# Patient Record
Sex: MALE | Race: WHITE | NOT HISPANIC OR LATINO | Employment: UNEMPLOYED | URBAN - NONMETROPOLITAN AREA
[De-identification: names, ages, dates, MRNs, and addresses within clinical notes are randomized per-mention and may not be internally consistent; named-entity substitution may affect disease eponyms.]

---

## 2023-01-01 ENCOUNTER — APPOINTMENT (OUTPATIENT)
Dept: GENERAL RADIOLOGY | Facility: HOSPITAL | Age: 68
End: 2023-01-01
Payer: COMMERCIAL

## 2023-01-01 ENCOUNTER — OUTSIDE FACILITY SERVICE (OUTPATIENT)
Dept: PULMONOLOGY | Facility: CLINIC | Age: 68
End: 2023-01-01
Payer: MEDICARE

## 2023-01-01 ENCOUNTER — HOSPITAL ENCOUNTER (OUTPATIENT)
Facility: HOSPITAL | Age: 68
End: 2023-01-16
Attending: INTERNAL MEDICINE | Admitting: INTERNAL MEDICINE
Payer: COMMERCIAL

## 2023-01-01 VITALS — WEIGHT: 100.31 LBS | BODY MASS INDEX: 16.71 KG/M2 | HEIGHT: 65 IN

## 2023-01-01 DIAGNOSIS — Z78.9 IMPAIRED MOBILITY AND ADLS: ICD-10-CM

## 2023-01-01 DIAGNOSIS — Z74.09 IMPAIRED PHYSICAL MOBILITY: ICD-10-CM

## 2023-01-01 DIAGNOSIS — R13.12 OROPHARYNGEAL DYSPHAGIA: ICD-10-CM

## 2023-01-01 DIAGNOSIS — Z74.09 IMPAIRED MOBILITY AND ADLS: ICD-10-CM

## 2023-01-01 LAB
ALBUMIN SERPL-MCNC: 2.2 G/DL (ref 3.5–5.2)
ALBUMIN SERPL-MCNC: 3 G/DL (ref 3.5–5.2)
ALBUMIN SERPL-MCNC: 3.3 G/DL (ref 3.5–5.2)
ALBUMIN SERPL-MCNC: 3.5 G/DL (ref 3.5–5.2)
ALBUMIN/GLOB SERPL: 0.9 G/DL
ALBUMIN/GLOB SERPL: 1.2 G/DL
ALBUMIN/GLOB SERPL: 1.3 G/DL
ALBUMIN/GLOB SERPL: 1.3 G/DL
ALP SERPL-CCNC: 102 U/L (ref 39–117)
ALP SERPL-CCNC: 114 U/L (ref 39–117)
ALP SERPL-CCNC: 120 U/L (ref 39–117)
ALP SERPL-CCNC: 88 U/L (ref 39–117)
ALT SERPL W P-5'-P-CCNC: 132 U/L (ref 1–41)
ALT SERPL W P-5'-P-CCNC: 19 U/L (ref 1–41)
ALT SERPL W P-5'-P-CCNC: 37 U/L (ref 1–41)
ALT SERPL W P-5'-P-CCNC: 56 U/L (ref 1–41)
ANION GAP SERPL CALCULATED.3IONS-SCNC: 0 MMOL/L (ref 5–15)
ANION GAP SERPL CALCULATED.3IONS-SCNC: 2 MMOL/L (ref 5–15)
ANION GAP SERPL CALCULATED.3IONS-SCNC: 6 MMOL/L (ref 5–15)
ANION GAP SERPL CALCULATED.3IONS-SCNC: 8 MMOL/L (ref 5–15)
ANION GAP SERPL CALCULATED.3IONS-SCNC: ABNORMAL MMOL/L
ANION GAP SERPL CALCULATED.3IONS-SCNC: ABNORMAL MMOL/L
ANISOCYTOSIS BLD QL: ABNORMAL
ARTERIAL PATENCY WRIST A: ABNORMAL
ARTERIAL PATENCY WRIST A: POSITIVE
AST SERPL-CCNC: 20 U/L (ref 1–40)
AST SERPL-CCNC: 21 U/L (ref 1–40)
AST SERPL-CCNC: 24 U/L (ref 1–40)
AST SERPL-CCNC: 37 U/L (ref 1–40)
ATMOSPHERIC PRESS: 740 MMHG
ATMOSPHERIC PRESS: 744 MMHG
ATMOSPHERIC PRESS: 751 MMHG
ATMOSPHERIC PRESS: 752 MMHG
ATMOSPHERIC PRESS: 753 MMHG
ATMOSPHERIC PRESS: 753 MMHG
BACTERIA SPEC AEROBE CULT: NORMAL
BACTERIA SPEC AEROBE CULT: NORMAL
BASE EXCESS BLDA CALC-SCNC: 14.5 MMOL/L (ref 0–2)
BASE EXCESS BLDA CALC-SCNC: 15.7 MMOL/L (ref 0–2)
BASE EXCESS BLDA CALC-SCNC: 23.6 MMOL/L (ref 0–2)
BASE EXCESS BLDA CALC-SCNC: 24.6 MMOL/L (ref 0–2)
BASE EXCESS BLDA CALC-SCNC: 25.1 MMOL/L (ref 0–2)
BASE EXCESS BLDA CALC-SCNC: 25.9 MMOL/L (ref 0–2)
BASOPHILS # BLD AUTO: 0.01 10*3/MM3 (ref 0–0.2)
BASOPHILS # BLD AUTO: 0.03 10*3/MM3 (ref 0–0.2)
BASOPHILS # BLD AUTO: 0.03 10*3/MM3 (ref 0–0.2)
BASOPHILS NFR BLD AUTO: 0.1 % (ref 0–1.5)
BASOPHILS NFR BLD AUTO: 0.1 % (ref 0–1.5)
BASOPHILS NFR BLD AUTO: 0.2 % (ref 0–1.5)
BDY SITE: ABNORMAL
BILIRUB SERPL-MCNC: 0.2 MG/DL (ref 0–1.2)
BILIRUB SERPL-MCNC: 0.3 MG/DL (ref 0–1.2)
BILIRUB SERPL-MCNC: 0.5 MG/DL (ref 0–1.2)
BILIRUB SERPL-MCNC: 0.6 MG/DL (ref 0–1.2)
BUN SERPL-MCNC: 109 MG/DL (ref 8–23)
BUN SERPL-MCNC: 22 MG/DL (ref 8–23)
BUN SERPL-MCNC: 42 MG/DL (ref 8–23)
BUN SERPL-MCNC: 45 MG/DL (ref 8–23)
BUN SERPL-MCNC: 46 MG/DL (ref 8–23)
BUN SERPL-MCNC: 46 MG/DL (ref 8–23)
BUN SERPL-MCNC: 49 MG/DL (ref 8–23)
BUN SERPL-MCNC: 62 MG/DL (ref 8–23)
BUN/CREAT SERPL: 106.9 (ref 7–25)
BUN/CREAT SERPL: 46.8 (ref 7–25)
BUN/CREAT SERPL: 67.7 (ref 7–25)
BUN/CREAT SERPL: 88.5 (ref 7–25)
BUN/CREAT SERPL: 90 (ref 7–25)
BUN/CREAT SERPL: 94.2 (ref 7–25)
BUN/CREAT SERPL: 97.7 (ref 7–25)
BUN/CREAT SERPL: 97.9 (ref 7–25)
C3 SERPL-MCNC: 62 MG/DL (ref 82–167)
C4 SERPL-MCNC: 15 MG/DL (ref 14–44)
CALCIUM SPEC-SCNC: 8.2 MG/DL (ref 8.6–10.5)
CALCIUM SPEC-SCNC: 8.2 MG/DL (ref 8.6–10.5)
CALCIUM SPEC-SCNC: 8.3 MG/DL (ref 8.6–10.5)
CALCIUM SPEC-SCNC: 8.4 MG/DL (ref 8.6–10.5)
CALCIUM SPEC-SCNC: 8.6 MG/DL (ref 8.6–10.5)
CALCIUM SPEC-SCNC: 8.8 MG/DL (ref 8.6–10.5)
CHLORIDE SERPL-SCNC: 101 MMOL/L (ref 98–107)
CHLORIDE SERPL-SCNC: 102 MMOL/L (ref 98–107)
CHLORIDE SERPL-SCNC: 85 MMOL/L (ref 98–107)
CHLORIDE SERPL-SCNC: 91 MMOL/L (ref 98–107)
CHLORIDE SERPL-SCNC: 93 MMOL/L (ref 98–107)
CHLORIDE SERPL-SCNC: 94 MMOL/L (ref 98–107)
CHLORIDE SERPL-SCNC: 97 MMOL/L (ref 98–107)
CHLORIDE SERPL-SCNC: 99 MMOL/L (ref 98–107)
CO2 SERPL-SCNC: 34 MMOL/L (ref 22–29)
CO2 SERPL-SCNC: 45 MMOL/L (ref 22–29)
CO2 SERPL-SCNC: 47 MMOL/L (ref 22–29)
CO2 SERPL-SCNC: 49 MMOL/L (ref 22–29)
CO2 SERPL-SCNC: 50 MMOL/L (ref 22–29)
CO2 SERPL-SCNC: 50 MMOL/L (ref 22–29)
CO2 SERPL-SCNC: >50 MMOL/L (ref 22–29)
CO2 SERPL-SCNC: >50 MMOL/L (ref 22–29)
CREAT SERPL-MCNC: 0.43 MG/DL (ref 0.76–1.27)
CREAT SERPL-MCNC: 0.47 MG/DL (ref 0.76–1.27)
CREAT SERPL-MCNC: 0.47 MG/DL (ref 0.76–1.27)
CREAT SERPL-MCNC: 0.5 MG/DL (ref 0.76–1.27)
CREAT SERPL-MCNC: 0.52 MG/DL (ref 0.76–1.27)
CREAT SERPL-MCNC: 0.52 MG/DL (ref 0.76–1.27)
CREAT SERPL-MCNC: 0.58 MG/DL (ref 0.76–1.27)
CREAT SERPL-MCNC: 1.61 MG/DL (ref 0.76–1.27)
DEPRECATED RDW RBC AUTO: 45.1 FL (ref 37–54)
DEPRECATED RDW RBC AUTO: 49.1 FL (ref 37–54)
DEPRECATED RDW RBC AUTO: 50.1 FL (ref 37–54)
DEPRECATED RDW RBC AUTO: 55.7 FL (ref 37–54)
EGFRCR SERPLBLD CKD-EPI 2021: 107.6 ML/MIN/1.73
EGFRCR SERPLBLD CKD-EPI 2021: 111.2 ML/MIN/1.73
EGFRCR SERPLBLD CKD-EPI 2021: 111.2 ML/MIN/1.73
EGFRCR SERPLBLD CKD-EPI 2021: 112.5 ML/MIN/1.73
EGFRCR SERPLBLD CKD-EPI 2021: 114.6 ML/MIN/1.73
EGFRCR SERPLBLD CKD-EPI 2021: 114.6 ML/MIN/1.73
EGFRCR SERPLBLD CKD-EPI 2021: 117.7 ML/MIN/1.73
EGFRCR SERPLBLD CKD-EPI 2021: 46.9 ML/MIN/1.73
EOSINOPHIL # BLD AUTO: 0 10*3/MM3 (ref 0–0.4)
EOSINOPHIL NFR BLD AUTO: 0 % (ref 0.3–6.2)
EPAP: 4
EPAP: 5
ERYTHROCYTE [DISTWIDTH] IN BLOOD BY AUTOMATED COUNT: 14.3 % (ref 12.3–15.4)
ERYTHROCYTE [DISTWIDTH] IN BLOOD BY AUTOMATED COUNT: 14.5 % (ref 12.3–15.4)
ERYTHROCYTE [DISTWIDTH] IN BLOOD BY AUTOMATED COUNT: 15.5 % (ref 12.3–15.4)
ERYTHROCYTE [DISTWIDTH] IN BLOOD BY AUTOMATED COUNT: 16.3 % (ref 12.3–15.4)
GAS FLOW AIRWAY: 3 LPM
GAS FLOW AIRWAY: 3 LPM
GLOBULIN UR ELPH-MCNC: 2.4 GM/DL
GLOBULIN UR ELPH-MCNC: 2.5 GM/DL
GLOBULIN UR ELPH-MCNC: 2.5 GM/DL
GLOBULIN UR ELPH-MCNC: 2.7 GM/DL
GLUCOSE BLDC GLUCOMTR-MCNC: 102 MG/DL (ref 70–130)
GLUCOSE BLDC GLUCOMTR-MCNC: 102 MG/DL (ref 70–130)
GLUCOSE BLDC GLUCOMTR-MCNC: 122 MG/DL (ref 70–130)
GLUCOSE BLDC GLUCOMTR-MCNC: 126 MG/DL (ref 70–130)
GLUCOSE BLDC GLUCOMTR-MCNC: 129 MG/DL (ref 70–130)
GLUCOSE BLDC GLUCOMTR-MCNC: 131 MG/DL (ref 70–130)
GLUCOSE BLDC GLUCOMTR-MCNC: 133 MG/DL (ref 70–130)
GLUCOSE BLDC GLUCOMTR-MCNC: 137 MG/DL (ref 70–130)
GLUCOSE BLDC GLUCOMTR-MCNC: 138 MG/DL (ref 70–130)
GLUCOSE BLDC GLUCOMTR-MCNC: 139 MG/DL (ref 70–130)
GLUCOSE BLDC GLUCOMTR-MCNC: 153 MG/DL (ref 70–130)
GLUCOSE BLDC GLUCOMTR-MCNC: 163 MG/DL (ref 70–130)
GLUCOSE BLDC GLUCOMTR-MCNC: 168 MG/DL (ref 70–130)
GLUCOSE BLDC GLUCOMTR-MCNC: 169 MG/DL (ref 70–130)
GLUCOSE BLDC GLUCOMTR-MCNC: 172 MG/DL (ref 70–130)
GLUCOSE BLDC GLUCOMTR-MCNC: 175 MG/DL (ref 70–130)
GLUCOSE BLDC GLUCOMTR-MCNC: 178 MG/DL (ref 70–130)
GLUCOSE BLDC GLUCOMTR-MCNC: 179 MG/DL (ref 70–130)
GLUCOSE BLDC GLUCOMTR-MCNC: 179 MG/DL (ref 70–130)
GLUCOSE BLDC GLUCOMTR-MCNC: 182 MG/DL (ref 70–130)
GLUCOSE BLDC GLUCOMTR-MCNC: 183 MG/DL (ref 70–130)
GLUCOSE BLDC GLUCOMTR-MCNC: 185 MG/DL (ref 70–130)
GLUCOSE BLDC GLUCOMTR-MCNC: 190 MG/DL (ref 70–130)
GLUCOSE BLDC GLUCOMTR-MCNC: 192 MG/DL (ref 70–130)
GLUCOSE BLDC GLUCOMTR-MCNC: 203 MG/DL (ref 70–130)
GLUCOSE BLDC GLUCOMTR-MCNC: 206 MG/DL (ref 70–130)
GLUCOSE BLDC GLUCOMTR-MCNC: 207 MG/DL (ref 70–130)
GLUCOSE BLDC GLUCOMTR-MCNC: 211 MG/DL (ref 70–130)
GLUCOSE BLDC GLUCOMTR-MCNC: 214 MG/DL (ref 70–130)
GLUCOSE BLDC GLUCOMTR-MCNC: 217 MG/DL (ref 70–130)
GLUCOSE BLDC GLUCOMTR-MCNC: 225 MG/DL (ref 70–130)
GLUCOSE BLDC GLUCOMTR-MCNC: 225 MG/DL (ref 70–130)
GLUCOSE BLDC GLUCOMTR-MCNC: 227 MG/DL (ref 70–130)
GLUCOSE BLDC GLUCOMTR-MCNC: 231 MG/DL (ref 70–130)
GLUCOSE BLDC GLUCOMTR-MCNC: 234 MG/DL (ref 70–130)
GLUCOSE BLDC GLUCOMTR-MCNC: 237 MG/DL (ref 70–130)
GLUCOSE BLDC GLUCOMTR-MCNC: 239 MG/DL (ref 70–130)
GLUCOSE BLDC GLUCOMTR-MCNC: 241 MG/DL (ref 70–130)
GLUCOSE BLDC GLUCOMTR-MCNC: 247 MG/DL (ref 70–130)
GLUCOSE BLDC GLUCOMTR-MCNC: 251 MG/DL (ref 70–130)
GLUCOSE BLDC GLUCOMTR-MCNC: 259 MG/DL (ref 70–130)
GLUCOSE BLDC GLUCOMTR-MCNC: 271 MG/DL (ref 70–130)
GLUCOSE BLDC GLUCOMTR-MCNC: 341 MG/DL (ref 70–130)
GLUCOSE BLDC GLUCOMTR-MCNC: 344 MG/DL (ref 70–130)
GLUCOSE BLDC GLUCOMTR-MCNC: 78 MG/DL (ref 70–130)
GLUCOSE BLDC GLUCOMTR-MCNC: 79 MG/DL (ref 70–130)
GLUCOSE SERPL-MCNC: 154 MG/DL (ref 65–99)
GLUCOSE SERPL-MCNC: 169 MG/DL (ref 65–99)
GLUCOSE SERPL-MCNC: 226 MG/DL (ref 65–99)
GLUCOSE SERPL-MCNC: 240 MG/DL (ref 65–99)
GLUCOSE SERPL-MCNC: 248 MG/DL (ref 65–99)
GLUCOSE SERPL-MCNC: 274 MG/DL (ref 65–99)
GLUCOSE SERPL-MCNC: 277 MG/DL (ref 65–99)
GLUCOSE SERPL-MCNC: 85 MG/DL (ref 65–99)
HCO3 BLDA-SCNC: 44.1 MMOL/L (ref 20–26)
HCO3 BLDA-SCNC: 45.3 MMOL/L (ref 20–26)
HCO3 BLDA-SCNC: 52.2 MMOL/L (ref 20–26)
HCO3 BLDA-SCNC: 52.8 MMOL/L (ref 20–26)
HCO3 BLDA-SCNC: 54.9 MMOL/L (ref 20–26)
HCO3 BLDA-SCNC: 55.3 MMOL/L (ref 20–26)
HCT VFR BLD AUTO: 29.6 % (ref 37.5–51)
HCT VFR BLD AUTO: 35.2 % (ref 37.5–51)
HCT VFR BLD AUTO: 38.3 % (ref 37.5–51)
HCT VFR BLD AUTO: 38.6 % (ref 37.5–51)
HGB BLD-MCNC: 10.4 G/DL (ref 13–17.7)
HGB BLD-MCNC: 12.1 G/DL (ref 13–17.7)
HGB BLD-MCNC: 12.2 G/DL (ref 13–17.7)
HGB BLD-MCNC: 8.7 G/DL (ref 13–17.7)
HYPOCHROMIA BLD QL: ABNORMAL
IMM GRANULOCYTES # BLD AUTO: 0.06 10*3/MM3 (ref 0–0.05)
IMM GRANULOCYTES # BLD AUTO: 0.11 10*3/MM3 (ref 0–0.05)
IMM GRANULOCYTES # BLD AUTO: 0.21 10*3/MM3 (ref 0–0.05)
IMM GRANULOCYTES NFR BLD AUTO: 0.5 % (ref 0–0.5)
IMM GRANULOCYTES NFR BLD AUTO: 0.8 % (ref 0–0.5)
IMM GRANULOCYTES NFR BLD AUTO: 0.9 % (ref 0–0.5)
INHALED O2 CONCENTRATION: 30 %
INHALED O2 CONCENTRATION: 40 %
IPAP: 18
LYMPHOCYTES # BLD AUTO: 0.13 10*3/MM3 (ref 0.7–3.1)
LYMPHOCYTES # BLD AUTO: 0.28 10*3/MM3 (ref 0.7–3.1)
LYMPHOCYTES # BLD AUTO: 0.64 10*3/MM3 (ref 0.7–3.1)
LYMPHOCYTES # BLD MANUAL: 0.32 10*3/MM3 (ref 0.7–3.1)
LYMPHOCYTES NFR BLD AUTO: 1.1 % (ref 19.6–45.3)
LYMPHOCYTES NFR BLD AUTO: 2 % (ref 19.6–45.3)
LYMPHOCYTES NFR BLD AUTO: 2.6 % (ref 19.6–45.3)
LYMPHOCYTES NFR BLD MANUAL: 15 % (ref 5–12)
Lab: ABNORMAL
MAGNESIUM SERPL-MCNC: 2 MG/DL (ref 1.6–2.4)
MAGNESIUM SERPL-MCNC: 2.5 MG/DL (ref 1.6–2.4)
MAGNESIUM SERPL-MCNC: 2.5 MG/DL (ref 1.6–2.4)
MAGNESIUM SERPL-MCNC: 3.1 MG/DL (ref 1.6–2.4)
MCH RBC QN AUTO: 27.5 PG (ref 26.6–33)
MCH RBC QN AUTO: 28.4 PG (ref 26.6–33)
MCH RBC QN AUTO: 28.5 PG (ref 26.6–33)
MCH RBC QN AUTO: 28.7 PG (ref 26.6–33)
MCHC RBC AUTO-ENTMCNC: 29.4 G/DL (ref 31.5–35.7)
MCHC RBC AUTO-ENTMCNC: 29.5 G/DL (ref 31.5–35.7)
MCHC RBC AUTO-ENTMCNC: 31.6 G/DL (ref 31.5–35.7)
MCHC RBC AUTO-ENTMCNC: 31.6 G/DL (ref 31.5–35.7)
MCV RBC AUTO: 87.1 FL (ref 79–97)
MCV RBC AUTO: 90.8 FL (ref 79–97)
MCV RBC AUTO: 96.2 FL (ref 79–97)
MCV RBC AUTO: 97 FL (ref 79–97)
MODALITY: ABNORMAL
MONOCYTES # BLD AUTO: 0.49 10*3/MM3 (ref 0.1–0.9)
MONOCYTES # BLD AUTO: 1.06 10*3/MM3 (ref 0.1–0.9)
MONOCYTES # BLD AUTO: 2.23 10*3/MM3 (ref 0.1–0.9)
MONOCYTES # BLD: 1.18 10*3/MM3 (ref 0.1–0.9)
MONOCYTES NFR BLD AUTO: 4.1 % (ref 5–12)
MONOCYTES NFR BLD AUTO: 7.6 % (ref 5–12)
MONOCYTES NFR BLD AUTO: 9.2 % (ref 5–12)
NEUTROPHILS # BLD AUTO: 6.38 10*3/MM3 (ref 1.7–7)
NEUTROPHILS NFR BLD AUTO: 11.14 10*3/MM3 (ref 1.7–7)
NEUTROPHILS NFR BLD AUTO: 12.5 10*3/MM3 (ref 1.7–7)
NEUTROPHILS NFR BLD AUTO: 21.11 10*3/MM3 (ref 1.7–7)
NEUTROPHILS NFR BLD AUTO: 87.2 % (ref 42.7–76)
NEUTROPHILS NFR BLD AUTO: 89.4 % (ref 42.7–76)
NEUTROPHILS NFR BLD AUTO: 94.2 % (ref 42.7–76)
NEUTROPHILS NFR BLD MANUAL: 76 % (ref 42.7–76)
NEUTS BAND NFR BLD MANUAL: 5 % (ref 0–5)
NRBC BLD AUTO-RTO: 0 /100 WBC (ref 0–0.2)
OVALOCYTES BLD QL SMEAR: ABNORMAL
PAW @ PEAK INSP FLOW SETTING VENT: 18 CMH2O
PAW @ PEAK INSP FLOW SETTING VENT: 18 CMH2O
PCO2 BLDA: 70.4 MM HG (ref 35–45)
PCO2 BLDA: 85.3 MM HG (ref 35–45)
PCO2 BLDA: 86.3 MM HG (ref 35–45)
PCO2 BLDA: 90 MM HG (ref 35–45)
PCO2 BLDA: 93.7 MM HG (ref 35–45)
PCO2 BLDA: 98.2 MM HG (ref 35–45)
PH BLDA: 7.32 PH UNITS (ref 7.35–7.45)
PH BLDA: 7.33 PH UNITS (ref 7.35–7.45)
PH BLDA: 7.36 PH UNITS (ref 7.35–7.45)
PH BLDA: 7.38 PH UNITS (ref 7.35–7.45)
PH BLDA: 7.38 PH UNITS (ref 7.35–7.45)
PH BLDA: 7.48 PH UNITS (ref 7.35–7.45)
PLATELET # BLD AUTO: 124 10*3/MM3 (ref 140–450)
PLATELET # BLD AUTO: 302 10*3/MM3 (ref 140–450)
PLATELET # BLD AUTO: 385 10*3/MM3 (ref 140–450)
PLATELET # BLD AUTO: 443 10*3/MM3 (ref 140–450)
PMV BLD AUTO: 11.8 FL (ref 6–12)
PMV BLD AUTO: 12.6 FL (ref 6–12)
PMV BLD AUTO: 12.9 FL (ref 6–12)
PMV BLD AUTO: 14.8 FL (ref 6–12)
PO2 BLDA: 101 MM HG (ref 83–108)
PO2 BLDA: 49.7 MM HG (ref 83–108)
PO2 BLDA: 67.9 MM HG (ref 83–108)
PO2 BLDA: 71.9 MM HG (ref 83–108)
PO2 BLDA: 78.4 MM HG (ref 83–108)
PO2 BLDA: 81.7 MM HG (ref 83–108)
POTASSIUM SERPL-SCNC: 3.9 MMOL/L (ref 3.5–5.2)
POTASSIUM SERPL-SCNC: 4.1 MMOL/L (ref 3.5–5.2)
POTASSIUM SERPL-SCNC: 4.2 MMOL/L (ref 3.5–5.2)
POTASSIUM SERPL-SCNC: 4.4 MMOL/L (ref 3.5–5.2)
POTASSIUM SERPL-SCNC: 4.5 MMOL/L (ref 3.5–5.2)
POTASSIUM SERPL-SCNC: 5.3 MMOL/L (ref 3.5–5.2)
POTASSIUM SERPL-SCNC: 5.6 MMOL/L (ref 3.5–5.2)
PREALB SERPL-MCNC: 26.5 MG/DL (ref 20–40)
PROT SERPL-MCNC: 4.6 G/DL (ref 6–8.5)
PROT SERPL-MCNC: 5.5 G/DL (ref 6–8.5)
PROT SERPL-MCNC: 5.8 G/DL (ref 6–8.5)
PROT SERPL-MCNC: 6.2 G/DL (ref 6–8.5)
QT INTERVAL: 360 MS
QT INTERVAL: 392 MS
QTC INTERVAL: 437 MS
QTC INTERVAL: 462 MS
RBC # BLD AUTO: 3.05 10*6/MM3 (ref 4.14–5.8)
RBC # BLD AUTO: 3.66 10*6/MM3 (ref 4.14–5.8)
RBC # BLD AUTO: 4.22 10*6/MM3 (ref 4.14–5.8)
RBC # BLD AUTO: 4.43 10*6/MM3 (ref 4.14–5.8)
SAO2 % BLDCOA: 87.8 % (ref 94–99)
SAO2 % BLDCOA: 94.1 % (ref 94–99)
SAO2 % BLDCOA: 95.3 % (ref 94–99)
SAO2 % BLDCOA: 95.5 % (ref 94–99)
SAO2 % BLDCOA: 96.1 % (ref 94–99)
SAO2 % BLDCOA: 98.1 % (ref 94–99)
SET MECH RESP RATE: 16
SET MECH RESP RATE: 20
SMALL PLATELETS BLD QL SMEAR: ABNORMAL
SODIUM SERPL-SCNC: 133 MMOL/L (ref 136–145)
SODIUM SERPL-SCNC: 134 MMOL/L (ref 136–145)
SODIUM SERPL-SCNC: 144 MMOL/L (ref 136–145)
SODIUM SERPL-SCNC: 145 MMOL/L (ref 136–145)
SODIUM SERPL-SCNC: 147 MMOL/L (ref 136–145)
SODIUM SERPL-SCNC: 149 MMOL/L (ref 136–145)
SODIUM SERPL-SCNC: 151 MMOL/L (ref 136–145)
SODIUM SERPL-SCNC: 151 MMOL/L (ref 136–145)
VARIANT LYMPHS NFR BLD MANUAL: 4 % (ref 19.6–45.3)
VENTILATOR MODE: ABNORMAL
VT ON VENT VENT: 378 ML
VT ON VENT VENT: 450 ML
VT ON VENT VENT: 561 ML
WBC MORPH BLD: NORMAL
WBC NRBC COR # BLD: 11.83 10*3/MM3 (ref 3.4–10.8)
WBC NRBC COR # BLD: 13.98 10*3/MM3 (ref 3.4–10.8)
WBC NRBC COR # BLD: 24.22 10*3/MM3 (ref 3.4–10.8)
WBC NRBC COR # BLD: 7.88 10*3/MM3 (ref 3.4–10.8)
WHOLE BLOOD HOLD SPECIMEN: NORMAL

## 2023-01-01 PROCEDURE — 92610 EVALUATE SWALLOWING FUNCTION: CPT | Performed by: SPEECH-LANGUAGE PATHOLOGIST

## 2023-01-01 PROCEDURE — 99233 SBSQ HOSP IP/OBS HIGH 50: CPT | Performed by: INTERNAL MEDICINE

## 2023-01-01 PROCEDURE — 99291 CRITICAL CARE FIRST HOUR: CPT | Performed by: INTERNAL MEDICINE

## 2023-01-01 PROCEDURE — 25010000002 METHYLPREDNISOLONE PER 40 MG: Performed by: INTERNAL MEDICINE

## 2023-01-01 PROCEDURE — 85025 COMPLETE CBC W/AUTO DIFF WBC: CPT | Performed by: INTERNAL MEDICINE

## 2023-01-01 PROCEDURE — 25010000002 HEPARIN (PORCINE) PER 1000 UNITS: Performed by: INTERNAL MEDICINE

## 2023-01-01 PROCEDURE — 99222 1ST HOSP IP/OBS MODERATE 55: CPT | Performed by: INTERNAL MEDICINE

## 2023-01-01 PROCEDURE — 93010 ELECTROCARDIOGRAM REPORT: CPT | Performed by: INTERNAL MEDICINE

## 2023-01-01 PROCEDURE — 80048 BASIC METABOLIC PNL TOTAL CA: CPT | Performed by: INTERNAL MEDICINE

## 2023-01-01 PROCEDURE — 25010000002 CEFTRIAXONE PER 250 MG: Performed by: INTERNAL MEDICINE

## 2023-01-01 PROCEDURE — 82962 GLUCOSE BLOOD TEST: CPT

## 2023-01-01 PROCEDURE — 86160 COMPLEMENT ANTIGEN: CPT | Performed by: INTERNAL MEDICINE

## 2023-01-01 PROCEDURE — 82803 BLOOD GASES ANY COMBINATION: CPT

## 2023-01-01 PROCEDURE — 71045 X-RAY EXAM CHEST 1 VIEW: CPT

## 2023-01-01 PROCEDURE — 80053 COMPREHEN METABOLIC PANEL: CPT | Performed by: INTERNAL MEDICINE

## 2023-01-01 PROCEDURE — 97162 PT EVAL MOD COMPLEX 30 MIN: CPT

## 2023-01-01 PROCEDURE — 85007 BL SMEAR W/DIFF WBC COUNT: CPT | Performed by: INTERNAL MEDICINE

## 2023-01-01 PROCEDURE — 84132 ASSAY OF SERUM POTASSIUM: CPT | Performed by: INTERNAL MEDICINE

## 2023-01-01 PROCEDURE — 83735 ASSAY OF MAGNESIUM: CPT | Performed by: INTERNAL MEDICINE

## 2023-01-01 PROCEDURE — 25010000002 FUROSEMIDE PER 20 MG: Performed by: INTERNAL MEDICINE

## 2023-01-01 PROCEDURE — 25010000002 MORPHINE PER 10 MG

## 2023-01-01 PROCEDURE — 25010000002 LORAZEPAM PER 2 MG

## 2023-01-01 PROCEDURE — 93005 ELECTROCARDIOGRAM TRACING: CPT | Performed by: INTERNAL MEDICINE

## 2023-01-01 PROCEDURE — 97166 OT EVAL MOD COMPLEX 45 MIN: CPT

## 2023-01-01 PROCEDURE — 87040 BLOOD CULTURE FOR BACTERIA: CPT | Performed by: INTERNAL MEDICINE

## 2023-01-01 PROCEDURE — 74018 RADEX ABDOMEN 1 VIEW: CPT

## 2023-01-01 PROCEDURE — 84134 ASSAY OF PREALBUMIN: CPT | Performed by: INTERNAL MEDICINE

## 2023-01-01 RX ORDER — METHYLPREDNISOLONE SODIUM SUCCINATE 40 MG/ML
40 INJECTION, POWDER, LYOPHILIZED, FOR SOLUTION INTRAMUSCULAR; INTRAVENOUS EVERY 12 HOURS
Status: DISCONTINUED | OUTPATIENT
Start: 2023-01-01 | End: 2023-01-01

## 2023-01-01 RX ORDER — CARVEDILOL 6.25 MG/1
6.25 TABLET ORAL 2 TIMES DAILY WITH MEALS
Status: DISCONTINUED | OUTPATIENT
Start: 2023-01-01 | End: 2023-01-01

## 2023-01-01 RX ORDER — FUROSEMIDE 10 MG/ML
60 INJECTION INTRAMUSCULAR; INTRAVENOUS ONCE
Status: DISCONTINUED | OUTPATIENT
Start: 2023-01-01 | End: 2023-01-01

## 2023-01-01 RX ORDER — MORPHINE SULFATE 2 MG/ML
1 INJECTION, SOLUTION INTRAMUSCULAR; INTRAVENOUS
Status: DISCONTINUED | OUTPATIENT
Start: 2023-01-01 | End: 2023-01-01 | Stop reason: HOSPADM

## 2023-01-01 RX ORDER — TRAZODONE HYDROCHLORIDE 50 MG/1
25 TABLET ORAL NIGHTLY
Status: DISCONTINUED | OUTPATIENT
Start: 2023-01-01 | End: 2023-01-01

## 2023-01-01 RX ORDER — POTASSIUM CHLORIDE 1.5 G/1.77G
40 POWDER, FOR SOLUTION ORAL ONCE
Status: DISCONTINUED | OUTPATIENT
Start: 2023-01-01 | End: 2023-01-01

## 2023-01-01 RX ORDER — INSULIN ASPART 100 [IU]/ML
0-12 INJECTION, SOLUTION INTRAVENOUS; SUBCUTANEOUS
Status: DISCONTINUED | OUTPATIENT
Start: 2023-01-01 | End: 2023-01-01

## 2023-01-01 RX ORDER — ACETAMINOPHEN 650 MG/1
650 SUPPOSITORY RECTAL EVERY 4 HOURS PRN
Status: DISCONTINUED | OUTPATIENT
Start: 2023-01-01 | End: 2023-01-01

## 2023-01-01 RX ORDER — IPRATROPIUM BROMIDE AND ALBUTEROL SULFATE 2.5; .5 MG/3ML; MG/3ML
3 SOLUTION RESPIRATORY (INHALATION) EVERY 4 HOURS PRN
Status: DISCONTINUED | OUTPATIENT
Start: 2023-01-01 | End: 2023-01-01

## 2023-01-01 RX ORDER — CLOPIDOGREL BISULFATE 75 MG/1
75 TABLET ORAL DAILY
Status: DISCONTINUED | OUTPATIENT
Start: 2023-01-01 | End: 2023-01-01

## 2023-01-01 RX ORDER — ACETAMINOPHEN 160 MG/5ML
650 SOLUTION ORAL EVERY 4 HOURS PRN
Status: DISCONTINUED | OUTPATIENT
Start: 2023-01-01 | End: 2023-01-01

## 2023-01-01 RX ORDER — BUDESONIDE 0.5 MG/2ML
0.5 INHALANT ORAL
Status: DISCONTINUED | OUTPATIENT
Start: 2023-01-01 | End: 2023-01-01

## 2023-01-01 RX ORDER — DEXTROSE MONOHYDRATE 25 G/50ML
25 INJECTION, SOLUTION INTRAVENOUS
Status: DISCONTINUED | OUTPATIENT
Start: 2023-01-01 | End: 2023-01-01

## 2023-01-01 RX ORDER — ESCITALOPRAM OXALATE 10 MG/1
30 TABLET ORAL DAILY
Status: DISCONTINUED | OUTPATIENT
Start: 2023-01-01 | End: 2023-01-01

## 2023-01-01 RX ORDER — ONDANSETRON 2 MG/ML
4 INJECTION INTRAMUSCULAR; INTRAVENOUS EVERY 4 HOURS PRN
Status: DISCONTINUED | OUTPATIENT
Start: 2023-01-01 | End: 2023-01-01

## 2023-01-01 RX ORDER — ALPRAZOLAM 0.5 MG/1
0.5 TABLET ORAL 3 TIMES DAILY PRN
Status: DISCONTINUED | OUTPATIENT
Start: 2023-01-01 | End: 2023-01-01

## 2023-01-01 RX ORDER — SODIUM CHLORIDE 0.9 % (FLUSH) 0.9 %
3 SYRINGE (ML) INJECTION AS NEEDED
Status: DISCONTINUED | OUTPATIENT
Start: 2023-01-01 | End: 2023-01-01

## 2023-01-01 RX ORDER — ACETAMINOPHEN 325 MG/1
650 TABLET ORAL EVERY 6 HOURS PRN
Status: DISCONTINUED | OUTPATIENT
Start: 2023-01-01 | End: 2023-01-01

## 2023-01-01 RX ORDER — METRONIDAZOLE 500 MG/100ML
500 INJECTION, SOLUTION INTRAVENOUS EVERY 8 HOURS
Status: DISPENSED | OUTPATIENT
Start: 2023-01-01 | End: 2023-01-01

## 2023-01-01 RX ORDER — ISOSORBIDE DINITRATE 5 MG/1
15 TABLET ORAL
Status: DISCONTINUED | OUTPATIENT
Start: 2023-01-01 | End: 2023-01-01

## 2023-01-01 RX ORDER — ONDANSETRON 4 MG/1
4 TABLET, FILM COATED ORAL EVERY 6 HOURS PRN
Status: DISCONTINUED | OUTPATIENT
Start: 2023-01-01 | End: 2023-01-01

## 2023-01-01 RX ORDER — SODIUM CHLORIDE 0.9 % (FLUSH) 0.9 %
3 SYRINGE (ML) INJECTION EVERY 12 HOURS SCHEDULED
Status: DISCONTINUED | OUTPATIENT
Start: 2023-01-01 | End: 2023-01-01

## 2023-01-01 RX ORDER — HEPARIN SODIUM 5000 [USP'U]/ML
5000 INJECTION, SOLUTION INTRAVENOUS; SUBCUTANEOUS EVERY 8 HOURS SCHEDULED
Status: DISCONTINUED | OUTPATIENT
Start: 2023-01-01 | End: 2023-01-01

## 2023-01-01 RX ORDER — GUAIFENESIN 200 MG/10ML
200 LIQUID ORAL EVERY 4 HOURS PRN
Status: DISCONTINUED | OUTPATIENT
Start: 2023-01-01 | End: 2023-01-01

## 2023-01-01 RX ORDER — FUROSEMIDE 10 MG/ML
40 INJECTION INTRAMUSCULAR; INTRAVENOUS ONCE
Status: DISCONTINUED | OUTPATIENT
Start: 2023-01-01 | End: 2023-01-01

## 2023-01-01 RX ORDER — LORAZEPAM 2 MG/ML
1 INJECTION INTRAMUSCULAR
Status: DISCONTINUED | OUTPATIENT
Start: 2023-01-01 | End: 2023-01-01 | Stop reason: HOSPADM

## 2023-01-01 RX ORDER — ALBUTEROL SULFATE 2.5 MG/3ML
2.5 SOLUTION RESPIRATORY (INHALATION) EVERY 6 HOURS PRN
Status: DISCONTINUED | OUTPATIENT
Start: 2023-01-01 | End: 2023-01-01

## 2023-01-01 RX ORDER — ATORVASTATIN CALCIUM 40 MG/1
40 TABLET, FILM COATED ORAL NIGHTLY
Status: DISCONTINUED | OUTPATIENT
Start: 2023-01-01 | End: 2023-01-01

## 2023-01-05 NOTE — SIGNIFICANT NOTE
01/05/23 1439   OTHER   Discipline occupational therapist   Rehab Time/Intention   Session Not Performed unable to evaluate, medical status change  (OT/PT eval attempted. Pts SAO2 88% while fowlers in bed, nsg deferred eval at this time. OT to f/u as able.)   Recommendation   PT - Next Appointment 01/06/23   Recommendation   OT - Next Appointment 01/06/23

## 2023-01-05 NOTE — SIGNIFICANT NOTE
01/05/23 1442   OTHER   Discipline occupational therapist;physical therapist   Rehab Time/Intention   Session Not Performed unable to evaluate, medical status change  (OT/PT eval attempted. Pts SAO2 88% while fowlers in bed, nsg deferred eval at this time. OT to f/u as able.)   Recommendation   PT - Next Appointment 01/06/23   Recommendation   OT - Next Appointment 01/06/23

## 2023-01-05 NOTE — ACP (ADVANCE CARE PLANNING)
68yo male admit to [facility] 12/27/22 w/ cardiac arrest and required intubation. Extubation 12/30. Hx COPD noted. T/F to LTACH. NG tube continues for EN, receiving x12 hours at night per t/f orders. Wound to right buttock and possible DTI bilateral heels and left buttock. RN notes [facility] Soft diet order from [place]. Diet order was placed along with SLP evaluation. NSR at 35ml/hr with 30ml/hr flush x12 hours provides 840cal, 38g pro and 302ml (+360 ml flushes) totals 662ml fluids per EN. Noted wt in EPIC notes 3/2022 135# and # w /BMI 16.7, underweight. Pt is 65in w/ #--EEN using IBW (25-30cal/kg) 1545-1854cal, (1-1.2g/kg protein) 62-74g pro. Pt w/ noted -26% wt loss in 9mo, significant. NFPE notes severe fat loss/muscle wasting to body. Pt meets criteria for severe, chronic malnutrition possibly r/t COPD hx and exacerbated by recent medical issues. Add renal restrictions to po d/t elev K levels as well as NSR al meals to optimize po intake. Will monitor intake and wt for possible adjustments to TF.

## 2023-01-05 NOTE — ACP (ADVANCE CARE PLANNING)
[facility] @ [facility]  INPATIENT HISTORY AND PHYSICAL NOTE    PATIENT NAME: Jonathan Williamson      PHYSICIAN: Josue Howe MD  : 1955        MRN: [id number]  Patient Care Team:  Provider, No Known as PCP - General    Assessment      Acute on chronic hypoxic respiratory failure.  S/p cardiac arrest.  Septic shock, resolved.  Critical illness myopathy.  Grade diastolic dysfunction with congestive heart failure.  Hypothermia.  Coronary artery disease.  Chronic obstructive pulmonary disease exacerbation.  Hypertension  Dyslipidemia.  Depression.  DVT and GI prophylaxis.  Cachexia with BMI less than and severe protein calorie malnutrition.    DVT Prophylaxis: SCDs and JAYJAY stockings.  GI Prophylaxis: We will start Protonix.  Code Status: Full code.    Plan     -We will continue IV antibiotics.  -Continue with IV steroids and nebulizer treatment.  -Continue oxygen supplementation wean as tolerated.  -We will continue with aggressive therapy and try to get patient out of bed to chair as tolerated.  -We will monitor blood work and radiologic studies closely.  -We will continue with tube feedings via Dobbhoff and monitor.  -We will get speech and swallow evaluation.  -DVT and GI prophylaxis in place.  -We'll continue monitoring patient in hospital setting and treat patient as course dictates.  -Please review orders for detailed plan of care.  -For Aute and Chronic medical condition we will continue medications described below in medication section which have been reviewed and we will continue unless changed in plan of care.  -Laboratory and diagnostic studies have been independently reviewed and the reports are reviewed as documented below.    Chief Complaint: Patient was admitted after arrest and return of spontaneous circulation at [facility].  History of Present Illness: 67-year-old gentleman with history of COPD medic pneumothorax who had been admitted  to hospital hospital after patient was found to have chest pain and had cardiac arrest patient had received cardiopulmonary resuscitation as per ACLS protocol with epinephrine which resulted in return of spontaneous circulation.  Patient was intubated and was on sedation.  Patient's ABG was reported 7.08/109 32.  Patient was also reported to be hypotensive and norepinephrine drip along with vasopressin drip.  Patient was covered with broad-spectrum IV antibiotics.  Patient was admitted to [facility] for further treatment.  Patient's condition gradually improved.  Patient was managed to be extubated however patient remains extremely weak and was not able to tolerate oral diet.  Patient was continued on antibiotics.  Due to patient's extreme critical illness along with weakness and deconditioning with requirement of Dobbhoff feedings patient was recommended admitted to our facility the discharge summary dictated on January 4 had been reviewed as documented above.  At the time of evaluation patient was resting comfortably.  Patient denies any complaints.  No headache or blurry vision reported.  No chest pain palpitation reported.  Patient states he is having some chest tightness however he attributes to the CPR.  Patient also complains of feeling weak.  No nausea vomiting reported.  Patient's echocardiogram done on December 28 suggestive of diastolic congestive heart failure.  Patient's EKG done on December suggestive of normal sinus rhythm at 78 bpm with no acute ST changes noted.  No chamber enlargement has been identified.  Patient CT head was negative for any acute findings as well.  That was done on December 27, 2022.  Laboratory results prior to coming to her facility where from January 4, 2023 white count 12.9 hemoglobin 11.1 hematocrit 34.3 platelet count of 338.  Sodium 134 potassium 4.9 chloride 94 bicarb 39 BUN of 18 creatinine 0.4 calcium 8.4.  Patient will be admitted to our facility for further  therapy especially try to get patient off of oxygen supplementation along with aggressive therapy and try to advance diet as tolerated.    Past medical history 1  Hypertension.  Dyslipidemia.  Coronary artery disease.  Chronic diastolic congestive heart failure.  Chronic obstructive pulmonary disease.  With chronic dependence on oxygen supplementation.    Past surgical history no significant surgical history has been reported in past.    Family history significant for hypertension.    Patient is ex-smoker.    Social History     Socioeconomic History   • Marital status:       Allergies   Allergen Reactions   • Lisinopril Unknown - High Severity   • Penicillins Unknown - High Severity      Prior to Admission medications    Not on File     Current Medications:   atorvastatin, 40 mg, Oral, Nightly  budesonide, 0.5 mg, Nebulization, BID - RT  carvedilol, 6.25 mg, Oral, BID With Meals  cefTRIAXone, 1 g, Intravenous, Q24H  clopidogrel, 75 mg, Oral, Daily  escitalopram, 30 mg, Oral, Daily  Insulin Aspart, 0-12 Units, Subcutaneous, 4x Daily AC & at Bedtime  isosorbide dinitrate, 15 mg, Oral, TID - Nitrates  methylPREDNISolone sodium succinate, 40 mg, Intravenous, Q12H  metroNIDAZOLE, 500 mg, Intravenous, Q8H  sodium chloride, 3 mL, Intracatheter, Q12H       •  acetaminophen **OR** acetaminophen **OR** acetaminophen  •  ALPRAZolam  •  dextrose  •  guaifenesin  •  ondansetron **OR** ondansetron  •  sodium chloride  •  sodium chloride       Review of Systems:    Review of Systems   Constitutional: Positive for activity change and appetite change. Negative for chills, diaphoresis and fever.   HENT: Negative for congestion, rhinorrhea, sore throat and trouble swallowing.    Eyes: Negative for visual disturbance.   Respiratory: Positive for shortness of breath. Negative for cough, chest tightness and wheezing.    Cardiovascular: Positive for chest pain. Negative for palpitations and leg swelling.   Gastrointestinal:  Negative for abdominal pain, blood in stool, diarrhea, nausea and vomiting.   Endocrine: Negative for cold intolerance and heat intolerance.   Genitourinary: Negative for decreased urine volume and difficulty urinating.   Musculoskeletal: Negative for back pain, gait problem and neck pain.   Skin: Negative for rash.   Neurological: Positive for weakness and light-headedness. Negative for dizziness, syncope, numbness and headaches.   Psychiatric/Behavioral: The patient is not nervous/anxious.        Objective   Vital Signs  Temp: 98.3 F         Heart Rate: 98         Resp: 18          Blood Pressure: 112/73         Pulse Ox: 94 %    Physical Exam:   Physical Exam  Vitals and nursing note reviewed.   Constitutional:       Appearance: He is well-developed.   HENT:      Head: Normocephalic and atraumatic.      Nose: Nose normal.   Eyes:      Extraocular Movements: Extraocular movements intact.      Conjunctiva/sclera: Conjunctivae normal.      Pupils: Pupils are equal, round, and reactive to light.   Neck:      Thyroid: No thyromegaly.      Vascular: No JVD.      Trachea: No tracheal deviation.   Cardiovascular:      Rate and Rhythm: Regular rhythm. Tachycardia present.      Heart sounds: Normal heart sounds.   Pulmonary:      Effort: Pulmonary effort is normal. No respiratory distress.      Breath sounds: Wheezing and rhonchi present. No rales.   Chest:      Chest wall: No tenderness.   Abdominal:      General: Bowel sounds are normal. There is no distension.      Palpations: Abdomen is soft.      Tenderness: There is no abdominal tenderness. There is no guarding or rebound.   Musculoskeletal:         General: Normal range of motion.      Cervical back: Normal range of motion and neck supple.   Lymphadenopathy:      Cervical: No cervical adenopathy.   Skin:     General: Skin is warm and dry.      Capillary Refill: Capillary refill takes 2 to 3 seconds.      Comments: Intact   Neurological:      Mental Status: He is  alert and oriented to person, place, and time.      Cranial Nerves: No cranial nerve deficit.      Motor: Weakness present.      Gait: Gait abnormal.      Deep Tendon Reflexes: Reflexes are normal and symmetric.   Psychiatric:         Mood and Affect: Mood normal.         Behavior: Behavior normal.       Results Review:    I have personally reviewed current lab, radiology, and diagnostic data and agree with results.  Results from last 7 days   Lab Units 01/05/23  0706 01/04/23  0611 01/03/23 0719 01/02/23 0626 01/01/23  0544 12/31/22  0606 12/30/22  0427   SODIUM mmol/L 133* 134* 138 137 144 143 141   POTASSIUM mmol/L 5.3* 4.9 5.0 5.2* 5.0 4.5 3.9   CHLORIDE mmol/L 91* 94* 94* 93* 99 103 104   TOTAL CO2 mmol/L  --  39* 39* 43* 41* 35* 32*   CO2 mmol/L 34.0*  --   --   --   --   --   --    BUN mg/dL 22 18 23 21 22 26* 31*   CREATININE mg/dL 0.47* 0.4* 0.5* 0.4* 0.5* 0.6* 0.7   GLUCOSE mg/dL 154*  --   --   --   --   --   --    CALCIUM mg/dL 8.6 8.4* 8.3* 7.9* 8.1* 7.8* 7.7*   BILIRUBIN mg/dL 0.5 0.57 0.42 0.39 0.40 0.40 0.42   ALK PHOS U/L 102 83 100 90 93 101 87   ALT (SGPT) U/L 132* 138* 182* 224* 309* 430* 540*   AST (SGOT) U/L 37 28 36 52* 99* 173* 317*     Results from last 7 days   Lab Units 01/05/23  0706 01/04/23  1931 01/04/23 0611 01/03/23 0719 01/02/23 0626 01/01/23  0544 12/31/22  0606 12/30/22  0428   MAGNESIUM mg/dL 2.0 1.8* 1.7* 1.8* 1.7* 1.9 2.0 2.1   PHOSPHORUS mg/dL  --   --  3.1 3.4 2.1* 1.9* 1.8* 1.5*     Results from last 7 days   Lab Units 01/05/23  0706   WBC 10*3/mm3 13.98*   HEMOGLOBIN g/dL 12.2*   HEMATOCRIT % 38.6   PLATELETS 10*3/mm3 443     Lab Results   Component Value Date    TROPONINI 0.14 (H) 12/28/2022     CO2   Date Value Ref Range Status   01/05/2023 34.0 (H) 22.0 - 29.0 mmol/L Final     Total CO2   Date Value Ref Range Status   01/04/2023 39 (H) 21 - 31 mmol/L Final     Results from last 7 days   Lab Units 01/02/23  0626 01/01/23  0544 12/31/22  0606 12/30/22  0427   INR   1.18 1.19 1.20 1.15     Imaging Results (Most Recent)     Procedure Component Value Units Date/Time    XR Abdomen KUB [[id number]] Collected: 01/04/23 2322     Updated: 01/04/23 2351    Narrative:      PROCEDURE:    XR ABDOMEN 1 VIEW (KUB)  dated  1/4/2023 11:22 PM  CST    CLINICAL HISTORY:   Male 67 years of age.   New Admission Verify  NG Tube Feeding Placement    TECHNIQUE: Frontal view supine abdomen.    PREVIOUS STUDIES:  None Available    FINDINGS:      Feeding tube within the descending duodenum.  No intraperitoneal free air. Intraluminal gas throughout  nondilated small intestine and colon.  Limited assessment of the origin soft tissue planes.      Impression:      Feeding tube within the descending duodenum.    Electronically signed by:  Nasreen Camacho MD  1/4/2023 11:49 PM  CST Workstation: [id number]        No results found for: ACANTHNAEG, AFBCX, BPERTUSSISCX, BLOODCX  No results found for: BCIDPCR, CXREFLEX, CSFCX, CULTURETIS  No results found for: CULTURES, HSVCX, URCX  No results found for: EYECULTURE, GCCX, HSVCULTURE, LABHSV  No results found for: LEGIONELLA, MRSACX, MUMPSCX, MYCOPLASCX  No results found for: NOCARDIACX, STOOLCX  No results found for: THROATCX, UNSTIMCULT, URINECX, CULTURE, VZVCULTUR  No results found for: VIRALCULTU, WOUNDCX  Dietary Orders (From admission, onward)     Start     Ordered    01/05/23 1004  Diet: Regular/House Diet, Diabetic Diets; Consistent Carbohydrate; Texture: Mechanical Ground (NDD 2); Fluid Consistency: Thin (IDDSI 0)  Diet Effective Now        References:    Diet Order [place]   Question Answer Comment   Diets: Regular/House Diet    Diets: Diabetic Diets    Diabetic Diet: Consistent Carbohydrate    Texture: Mechanical Ground (NDD 2)    Fluid Consistency: Thin (IDDSI 0)        01/05/23 1004    01/04/23 2303  Diet, Tube Feeding Novasource Renal (Nepro); Tube Feeding Type: Cyclic / Intermittent; Feeding Start Time: 9:00 PM; Feeding End Time: 9:00 AM; Cyclic  Feeding Start Rate (mL/hr): 35; To Goal Rate of (mL/hr): 35; Supplemental Bolus Feeding: No  Diet Effective Now        Question Answer Comment   Tube Feeding Formula: Novasource Renal (Nepro)    Tube Feeding Type Cyclic / Intermittent    Feeding Start Time: 9:00 PM    Feeding End Time: 9:00 AM    Cyclic Feeding Start Rate (mL/hr): 35    To Goal Rate of (mL/hr) 35    Supplemental Bolus Feeding: No    Water Flush Frequency Every 1 Hour    Water Flush Bolus (mL) 30        01/04/23 7971                Total Time Spent: 75 minutes.  History, physical exam, assessment and plan may have been partly or fully copied from before, but  changes made to the copied record to reflect care on the date of service. Part of the lab and imaging  reports auto populated and corrected. Some of this note may be an electronic transcription of spoken  language to printed text. This may permit erroneous, or at times, nonsensical words or phrases to be  inadvertently transcribed. Although I have reviewed the note for such errors, some may still exist.      This document has been electronically signed by Josue Howe MD on January 5, 2023 12:51 CST

## 2023-01-07 NOTE — ACP (ADVANCE CARE PLANNING)
[facility] @ [facility]  INPATIENT PROGRESS NOTE    PATIENT NAME: Jonathan Williamson      PHYSICIAN: Josue Howe MD  : 1955        MRN: [id number]  Patient Care Team:  Provider, No Known as PCP - General    Chief Complaint: Patient was admitted after arrest and return of spontaneous circulation at [facility].  History of Present Illness: 67-year-old gentleman with history of COPD medic pneumothorax who had been admitted to hospital hospital after patient was found to have chest pain and had cardiac arrest patient had received cardiopulmonary resuscitation as per ACLS protocol with epinephrine which resulted in return of spontaneous circulation.  Patient was intubated and was on sedation.  Patient's ABG was reported 7.08/109 32.  Patient was also reported to be hypotensive and norepinephrine drip along with vasopressin drip.  Patient was covered with broad-spectrum IV antibiotics.  Patient was admitted to [facility] for further treatment.  Patient's condition gradually improved.  Patient was managed to be extubated however patient remains extremely weak and was not able to tolerate oral diet.  Patient was continued on antibiotics.  Due to patient's extreme critical illness along with weakness and deconditioning with requirement of Dobbhoff feedings patient was recommended admitted to our facility the discharge summary dictated on  had been reviewed as documented above.  At the time of evaluation patient was resting comfortably.  Patient denies any complaints.  No headache or blurry vision reported.  No chest pain palpitation reported.  Patient states he is having some chest tightness however he attributes to the CPR.  Patient also complains of feeling weak.  No nausea vomiting reported.  Patient's echocardiogram done on  suggestive of diastolic congestive heart failure.  Patient's EKG done on December suggestive of normal  sinus rhythm at 78 bpm with no acute ST changes noted.  No chamber enlargement has been identified.  Patient CT head was negative for any acute findings as well.  That was done on December 27, 2022.  Laboratory results prior to coming to her facility where from January 4, 2023 white count 12.9 hemoglobin 11.1 hematocrit 34.3 platelet count of 338.  Sodium 134 potassium 4.9 chloride 94 bicarb 39 BUN of 18 creatinine 0.4 calcium 8.4.  Patient will be admitted to our facility for further therapy especially try to get patient off of oxygen supplementation along with aggressive therapy and try to advance diet as tolerated.       Assessment       Acute on chronic hypoxic respiratory failure.  S/p cardiac arrest.  Septic shock, resolved.  Critical illness myopathy.  Grade diastolic dysfunction with congestive heart failure.  Hypothermia.  Coronary artery disease.  Chronic obstructive pulmonary disease exacerbation.  Hypertension  Dyslipidemia.  Depression.  DVT and GI prophylaxis.  Cachexia with BMI less than and severe protein calorie malnutrition.     DVT Prophylaxis: SCDs and JAYJAY stockings.  GI Prophylaxis: We will start Protonix.  Code Status: Full code.     Plan      -Rocephin as before  -Continue with IV steroids and nebulizer treatment.  -Continue oxygen supplementation wean as tolerated.  -Bi-PAP therapy nightly  -Therapy as patient tolerates and strongly recommend out of bed daily.  -Monitor labs closely.  -Continue tube feeding as patient tolerates.  -DVT and GI prophylaxis in place.  -We'll continue monitoring patient in hospital setting and treat patient as course dictates.  -Please review orders for detailed plan of care.  -For Aute and Chronic medical condition we will continue medications described below in medication section which have been reviewed and we will continue unless changed in plan of care.  -Laboratory and diagnostic studies have been independently reviewed and the reports are reviewed as  documented below.     Subjective   Interval History:   Patient Complaints: Patient seen and examined.  No overnight events reported  History taken from: Chart, nursing staff.    Review of Systems:    Review of Systems   Constitutional: Positive for activity change and appetite change. Negative for chills, diaphoresis and fever.   HENT: Negative for congestion, rhinorrhea, sore throat and trouble swallowing.    Eyes: Negative for visual disturbance.   Respiratory: Negative for cough, chest tightness, shortness of breath and wheezing.    Cardiovascular: Negative for chest pain, palpitations and leg swelling.   Gastrointestinal: Negative for abdominal pain, blood in stool, diarrhea, nausea and vomiting.   Endocrine: Negative for cold intolerance and heat intolerance.   Genitourinary: Negative for decreased urine volume and difficulty urinating.   Musculoskeletal: Negative for back pain, gait problem and neck pain.   Skin: Negative for rash.   Neurological: Positive for weakness. Negative for dizziness, syncope, light-headedness, numbness and headaches.   Psychiatric/Behavioral: The patient is not nervous/anxious.        Objective   Vital Signs  Temp: 97.3 F         Blood Pressure: 186/91 Heart Rate: 88        Resp: 20           O2: 94 %    Physical Exam:   Physical Exam  Vitals and nursing note reviewed.   Constitutional:       Appearance: He is well-developed.   HENT:      Head: Normocephalic and atraumatic.      Nose: Nose normal.   Eyes:      Extraocular Movements: Extraocular movements intact.      Conjunctiva/sclera: Conjunctivae normal.      Pupils: Pupils are equal, round, and reactive to light.   Neck:      Thyroid: No thyromegaly.      Vascular: No JVD.      Trachea: No tracheal deviation.   Cardiovascular:      Rate and Rhythm: Regular rhythm. Tachycardia present.      Heart sounds: Normal heart sounds.   Pulmonary:      Effort: Pulmonary effort is normal. No respiratory distress.      Breath sounds: Wheezing  and rhonchi present. No rales.   Chest:      Chest wall: No tenderness.   Abdominal:      General: Bowel sounds are normal. There is no distension.      Palpations: Abdomen is soft.      Tenderness: There is no abdominal tenderness. There is no guarding or rebound.   Musculoskeletal:         General: Normal range of motion.      Cervical back: Normal range of motion and neck supple.   Lymphadenopathy:      Cervical: No cervical adenopathy.   Skin:     General: Skin is warm and dry.      Capillary Refill: Capillary refill takes 2 to 3 seconds.      Comments: Intact   Neurological:      Mental Status: He is alert and oriented to person, place, and time.      Cranial Nerves: No cranial nerve deficit.      Motor: Weakness present.      Gait: Gait abnormal.      Deep Tendon Reflexes: Reflexes are normal and symmetric.   Psychiatric:         Mood and Affect: Mood normal.         Behavior: Behavior normal.       Results Review:       Results from last 7 days   Lab Units 01/06/23  0700 01/05/23  0706 01/04/23  0611 01/03/23  0719 01/02/23  0626   SODIUM mmol/L  --  133* 134* 138 137   POTASSIUM mmol/L 4.5 5.3* 4.9 5.0 5.2*   CHLORIDE mmol/L  --  91* 94* 94* 93*   TOTAL CO2 mmol/L  --   --  39* 39* 43*   CO2 mmol/L  --  34.0*  --   --   --    BUN mg/dL  --  22 18 23 21   CREATININE mg/dL  --  0.47* 0.4* 0.5* 0.4*   GLUCOSE mg/dL  --  154*  --   --   --    CALCIUM mg/dL  --  8.6 8.4* 8.3* 7.9*   BILIRUBIN mg/dL  --  0.5 0.57 0.42 0.39   ALK PHOS U/L  --  102 83 100 90   ALT (SGPT) U/L  --  132* 138* 182* 224*   AST (SGOT) U/L  --  37 28 36 52*     Results from last 7 days   Lab Units 01/05/23  0706 01/04/23  1931 01/04/23  0611 01/03/23  0719 01/02/23  0626   MAGNESIUM mg/dL 2.0 1.8* 1.7* 1.8* 1.7*   PHOSPHORUS mg/dL  --   --  3.1 3.4 2.1*     Results from last 7 days   Lab Units 01/05/23  0706   WBC 10*3/mm3 13.98*   HEMOGLOBIN g/dL 12.2*   HEMATOCRIT % 38.6   PLATELETS 10*3/mm3 443     Lab Results   Component Value Date     TROPONINI 0.14 (H) 12/28/2022     pH, Arterial   Date Value Ref Range Status   01/07/2023 7.333 (L) 7.350 - 7.450 pH units Final     Comment:     84 Value below reference range     Results from last 7 days   Lab Units 01/02/23  0626   INR  1.18     Imaging Results (Most Recent)     Procedure Component Value Units Date/Time    XR Chest 1 View [[id number]] Collected: 01/05/23 1447     Updated: 01/05/23 1551    Narrative:      EXAM: XR CHEST 1 VIEW    HISTORY: Increased SOA, Z74.09 Other reduced mobility Z78.9 Other  specified health status    COMPARISON:    TECHNIQUE:   Portable view of the chest.    FINDINGS:   Bullous change in the right upper lung with coexisting streaky  change which may represent parenchymal scarring. There is feeding  tube in place with distal portion below the diaphragm..  Bilateral hyperinflation of the lung parenchyma due to COPD.  Probable chronic pleural parenchymal change in the left lateral  costophrenic angle.  The left lung remains well aerated. The heart size is within  normal limits. The mediastinal contours are within normal limits.  .  No evidence of mediastinal shift or pneumothorax.  Unremarkable visualized osseous structures.      Impression:      1.  COPD.  2.  Bullous change in the right upper lung with coexisting  streaky change which may represent parenchymal scarring.  3.  There is feeding tube in place with distal portion below the  diaphragm..          Electronically signed by:  Carl Mosley MD  1/5/2023 3:48 PM CST  Workstation: [id number]    XR Abdomen KUB [[id number]] Collected: 01/04/23 2322     Updated: 01/04/23 2351    Narrative:      PROCEDURE:    XR ABDOMEN 1 VIEW (KUB)  dated  1/4/2023 11:22 PM  CST    CLINICAL HISTORY:   Male 67 years of age.   New Admission Verify  NG Tube Feeding Placement    TECHNIQUE: Frontal view supine abdomen.    PREVIOUS STUDIES:  None Available    FINDINGS:      Feeding tube within the descending duodenum.  No intraperitoneal free air.  Intraluminal gas throughout  nondilated small intestine and colon.  Limited assessment of the origin soft tissue planes.      Impression:      Feeding tube within the descending duodenum.    Electronically signed by:  Nasreen Camacho MD  1/4/2023 11:49 PM  [facility] Workstation: [id number]         No results found for: ACANTHNAEG, AFBCX, BPERTUSSISCX, BLOODCX  No results found for: BCIDPCR, CXREFLEX, CSFCX, CULTURETIS  No results found for: CULTURES, HSVCX, URCX  No results found for: EYECULTURE, GCCX, HSVCULTURE, LABHSV  No results found for: LEGIONELLA, MRSACX, MUMPSCX, MYCOPLASCX  No results found for: NOCARDIACX, STOOLCX  No results found for: THROATCX, UNSTIMCULT, URINECX, CULTURE, VZVCULTUR  No results found for: VIRALCULTU, WOUNDCX  Medication Reviewed and Will Continue Unless Documented in Plan:   atorvastatin, 40 mg, Oral, Nightly  budesonide, 0.5 mg, Nebulization, BID - RT  carvedilol, 6.25 mg, Oral, BID With Meals  cefTRIAXone, 1 g, Intravenous, Q24H  clopidogrel, 75 mg, Oral, Daily  escitalopram, 30 mg, Oral, Daily  furosemide, 60 mg, Intravenous, Once  heparin (porcine), 5,000 Units, Subcutaneous, Q8H  Insulin Aspart, 0-12 Units, Subcutaneous, 4x Daily AC & at Bedtime  isosorbide dinitrate, 15 mg, Oral, TID - Nitrates  methylPREDNISolone sodium succinate, 40 mg, Intravenous, Q12H  metroNIDAZOLE, 500 mg, Intravenous, Q8H  potassium chloride, 40 mEq, Per PEG Tube, Once  sodium chloride, 3 mL, Intracatheter, Q12H  sodium zirconium cyclosilicate, 10 g, Oral, Daily  traZODone, 25 mg, Per PEG Tube, Nightly         •  acetaminophen **OR** acetaminophen **OR** acetaminophen  •  albuterol  •  ALPRAZolam  •  dextrose  •  guaifenesin  •  ipratropium-albuterol  •  ondansetron **OR** ondansetron  •  sodium chloride  •  sodium chloride      Dietary Orders (From admission, onward)     Start     Ordered    01/06/23 1204  Diet: Renal Diets; Low Potassium; Texture: Pureed (NDD 1); Fluid Consistency: Thin (IDDSI 0)  Diet  Effective Now        References:    Diet Order [place]   Question Answer Comment   Diets: Renal Diets    Renal Diet: Low Potassium    Texture: Pureed (NDD 1)    Fluid Consistency: Thin (IDDSI 0)        01/06/23 1204    01/05/23 1800  Dietary Nutrition Supplements Novasource Renal (Nepro)  Daily With Breakfast, Lunch & Dinner      Question:  Select Supplement:  Answer:  Novasource Renal (Nepro)    01/05/23 1426    01/05/23 1427  Diet, Tube Feeding Novasource Renal (Nepro); Tube Feeding Type: Cyclic / Intermittent; Feeding Start Time: 9:00 PM; Feeding End Time: 9:00 AM; Cyclic Feeding Start Rate (mL/hr): 35; To Goal Rate of (mL/hr): 35; Supplemental Bolus Feeding: No  Diet Effective Now        Question Answer Comment   Tube Feeding Formula: Novasource Renal (Nepro)    Tube Feeding Type Cyclic / Intermittent    Feeding Start Time: 9:00 PM    Feeding End Time: 9:00 AM    Cyclic Feeding Start Rate (mL/hr): 35    To Goal Rate of (mL/hr) 35    Supplemental Bolus Feeding: No    Water Flush Amount (mL) 30    Water Flush Frequency Every 1 Hour        01/05/23 1426              History, physical exam, assessment and plan may have been partly or fully copied from before, but  changes made to the copied record to reflect care on the date of service. Part of the lab and imaging  reports auto populated and corrected. Some of this note may be an electronic transcription of spoken  language to printed text. This may permit erroneous, or at times, nonsensical words or phrases to be  inadvertently transcribed. Although I have reviewed the note for such errors, some may still exist.      This document has been electronically signed by Josue Howe MD on January 8, 2023 16:36 CST

## 2023-01-07 NOTE — ACP (ADVANCE CARE PLANNING)
[facility] @ [facility]  INPATIENT PROGRESS NOTE    PATIENT NAME: Jonathan Williamson      PHYSICIAN: Josue Howe MD  : 1955        MRN: [id number]  Patient Care Team:  Provider, No Known as PCP - General    Chief Complaint: Patient was admitted after arrest and return of spontaneous circulation at [facility].  History of Present Illness: 67-year-old gentleman with history of COPD medic pneumothorax who had been admitted to hospital hospital after patient was found to have chest pain and had cardiac arrest patient had received cardiopulmonary resuscitation as per ACLS protocol with epinephrine which resulted in return of spontaneous circulation.  Patient was intubated and was on sedation.  Patient's ABG was reported 7.08/109 32.  Patient was also reported to be hypotensive and norepinephrine drip along with vasopressin drip.  Patient was covered with broad-spectrum IV antibiotics.  Patient was admitted to [facility] for further treatment.  Patient's condition gradually improved.  Patient was managed to be extubated however patient remains extremely weak and was not able to tolerate oral diet.  Patient was continued on antibiotics.  Due to patient's extreme critical illness along with weakness and deconditioning with requirement of Dobbhoff feedings patient was recommended admitted to our facility the discharge summary dictated on  had been reviewed as documented above.  At the time of evaluation patient was resting comfortably.  Patient denies any complaints.  No headache or blurry vision reported.  No chest pain palpitation reported.  Patient states he is having some chest tightness however he attributes to the CPR.  Patient also complains of feeling weak.  No nausea vomiting reported.  Patient's echocardiogram done on  suggestive of diastolic congestive heart failure.  Patient's EKG done on December suggestive of normal  sinus rhythm at 78 bpm with no acute ST changes noted.  No chamber enlargement has been identified.  Patient CT head was negative for any acute findings as well.  That was done on December 27, 2022.  Laboratory results prior to coming to her facility where from January 4, 2023 white count 12.9 hemoglobin 11.1 hematocrit 34.3 platelet count of 338.  Sodium 134 potassium 4.9 chloride 94 bicarb 39 BUN of 18 creatinine 0.4 calcium 8.4.  Patient will be admitted to our facility for further therapy especially try to get patient off of oxygen supplementation along with aggressive therapy and try to advance diet as tolerated.       Assessment       Acute on chronic hypoxic respiratory failure.  S/p cardiac arrest.  Septic shock, resolved.  Critical illness myopathy.  Grade diastolic dysfunction with congestive heart failure.  Hypothermia.  Coronary artery disease.  Chronic obstructive pulmonary disease exacerbation.  Hypertension  Dyslipidemia.  Depression.  DVT and GI prophylaxis.  Cachexia with BMI less than and severe protein calorie malnutrition.     DVT Prophylaxis: SCDs and JAYJAY stockings.  GI Prophylaxis: We will start Protonix.  Code Status: Full code.     Plan      -Rocephing as before  -Continue with IV steroids and nebulizer treatment.  -Continue oxygen supplementation wean as tolerated.  -Therapy as patient tolerates and strongly recommend out of bed daily.  -Monitor labs closely.  -Continue tube feeding as patient tolerates.  -DVT and GI prophylaxis in place.  -We'll continue monitoring patient in hospital setting and treat patient as course dictates.  -Please review orders for detailed plan of care.  -For Aute and Chronic medical condition we will continue medications described below in medication section which have been reviewed and we will continue unless changed in plan of care.  -Laboratory and diagnostic studies have been independently reviewed and the reports are reviewed as documented  below.     Subjective   Interval History:   Patient Complaints: Patient seen and examined.  Resting comfortably in bed with no concerns.  History taken from: Chart, nursing staff.    Review of Systems:    Review of Systems   Constitutional: Positive for activity change and appetite change. Negative for chills, diaphoresis and fever.   HENT: Negative for congestion, rhinorrhea, sore throat and trouble swallowing.    Eyes: Negative for visual disturbance.   Respiratory: Negative for cough, chest tightness, shortness of breath and wheezing.    Cardiovascular: Negative for chest pain, palpitations and leg swelling.   Gastrointestinal: Negative for abdominal pain, blood in stool, diarrhea, nausea and vomiting.   Endocrine: Negative for cold intolerance and heat intolerance.   Genitourinary: Negative for decreased urine volume and difficulty urinating.   Musculoskeletal: Negative for back pain, gait problem and neck pain.   Skin: Negative for rash.   Neurological: Positive for weakness. Negative for dizziness, syncope, light-headedness, numbness and headaches.   Psychiatric/Behavioral: The patient is not nervous/anxious.        Objective   Vital Signs  Temp: 97.1 F         Heart Rate: 134         Resp: 71          Blood Pressure: 82/22         Pulse Ox: 94 %    Physical Exam:   Physical Exam  Vitals and nursing note reviewed.   Constitutional:       Appearance: He is well-developed.   HENT:      Head: Normocephalic and atraumatic.      Nose: Nose normal.   Eyes:      Extraocular Movements: Extraocular movements intact.      Conjunctiva/sclera: Conjunctivae normal.      Pupils: Pupils are equal, round, and reactive to light.   Neck:      Thyroid: No thyromegaly.      Vascular: No JVD.      Trachea: No tracheal deviation.   Cardiovascular:      Rate and Rhythm: Regular rhythm. Tachycardia present.      Heart sounds: Normal heart sounds.   Pulmonary:      Effort: Pulmonary effort is normal. No respiratory distress.       Breath sounds: Wheezing and rhonchi present. No rales.   Chest:      Chest wall: No tenderness.   Abdominal:      General: Bowel sounds are normal. There is no distension.      Palpations: Abdomen is soft.      Tenderness: There is no abdominal tenderness. There is no guarding or rebound.   Musculoskeletal:         General: Normal range of motion.      Cervical back: Normal range of motion and neck supple.   Lymphadenopathy:      Cervical: No cervical adenopathy.   Skin:     General: Skin is warm and dry.      Capillary Refill: Capillary refill takes 2 to 3 seconds.      Comments: Intact   Neurological:      Mental Status: He is alert and oriented to person, place, and time.      Cranial Nerves: No cranial nerve deficit.      Motor: Weakness present.      Gait: Gait abnormal.      Deep Tendon Reflexes: Reflexes are normal and symmetric.   Psychiatric:         Mood and Affect: Mood normal.         Behavior: Behavior normal.       Results Review:       Results from last 7 days   Lab Units 01/06/23  0700 01/05/23  0706 01/04/23  0611 01/03/23 0719 01/02/23 0626 01/01/23  0544   SODIUM mmol/L  --  133* 134* 138 137 144   POTASSIUM mmol/L 4.5 5.3* 4.9 5.0 5.2* 5.0   CHLORIDE mmol/L  --  91* 94* 94* 93* 99   TOTAL CO2 mmol/L  --   --  39* 39* 43* 41*   CO2 mmol/L  --  34.0*  --   --   --   --    BUN mg/dL  --  22 18 23 21 22   CREATININE mg/dL  --  0.47* 0.4* 0.5* 0.4* 0.5*   GLUCOSE mg/dL  --  154*  --   --   --   --    CALCIUM mg/dL  --  8.6 8.4* 8.3* 7.9* 8.1*   BILIRUBIN mg/dL  --  0.5 0.57 0.42 0.39 0.40   ALK PHOS U/L  --  102 83 100 90 93   ALT (SGPT) U/L  --  132* 138* 182* 224* 309*   AST (SGOT) U/L  --  37 28 36 52* 99*     Results from last 7 days   Lab Units 01/05/23  0706 01/04/23  1931 01/04/23 0611 01/03/23 0719 01/02/23 0626 01/01/23  0544   MAGNESIUM mg/dL 2.0 1.8* 1.7* 1.8* 1.7* 1.9   PHOSPHORUS mg/dL  --   --  3.1 3.4 2.1* 1.9*     Results from last 7 days   Lab Units 01/05/23  0706   WBC 10*3/mm3  13.98*   HEMOGLOBIN g/dL 12.2*   HEMATOCRIT % 38.6   PLATELETS 10*3/mm3 443     Lab Results   Component Value Date    TROPONINI 0.14 (H) 12/28/2022     pH, Arterial   Date Value Ref Range Status   01/07/2023 7.333 (L) 7.350 - 7.450 pH units Final     Comment:     84 Value below reference range     CO2   Date Value Ref Range Status   01/05/2023 34.0 (H) 22.0 - 29.0 mmol/L Final     Results from last 7 days   Lab Units 01/02/23  0626 01/01/23  0544   INR  1.18 1.19     Imaging Results (Most Recent)     Procedure Component Value Units Date/Time    XR Chest 1 View [[id number]] Collected: 01/05/23 1447     Updated: 01/05/23 1551    Narrative:      EXAM: XR CHEST 1 VIEW    HISTORY: Increased SOA, Z74.09 Other reduced mobility Z78.9 Other  specified health status    COMPARISON:    TECHNIQUE:   Portable view of the chest.    FINDINGS:   Bullous change in the right upper lung with coexisting streaky  change which may represent parenchymal scarring. There is feeding  tube in place with distal portion below the diaphragm..  Bilateral hyperinflation of the lung parenchyma due to COPD.  Probable chronic pleural parenchymal change in the left lateral  costophrenic angle.  The left lung remains well aerated. The heart size is within  normal limits. The mediastinal contours are within normal limits.  .  No evidence of mediastinal shift or pneumothorax.  Unremarkable visualized osseous structures.      Impression:      1.  COPD.  2.  Bullous change in the right upper lung with coexisting  streaky change which may represent parenchymal scarring.  3.  There is feeding tube in place with distal portion below the  diaphragm..          Electronically signed by:  Carl Mosley MD  1/5/2023 3:48 PM CST  Workstation: [id number]    XR Abdomen KUB [[id number]] Collected: 01/04/23 2322     Updated: 01/04/23 2351    Narrative:      PROCEDURE:    XR ABDOMEN 1 VIEW (KUB)  dated  1/4/2023 11:22 PM  CST    CLINICAL HISTORY:   Male 67 years of age.   New  Admission Verify  NG Tube Feeding Placement    TECHNIQUE: Frontal view supine abdomen.    PREVIOUS STUDIES:  None Available    FINDINGS:      Feeding tube within the descending duodenum.  No intraperitoneal free air. Intraluminal gas throughout  nondilated small intestine and colon.  Limited assessment of the origin soft tissue planes.      Impression:      Feeding tube within the descending duodenum.    Electronically signed by:  Nasreen Camacho MD  1/4/2023 11:49 PM  CST Workstation: [id number]         No results found for: ACANTHNAEG, AFBCX, BPERTUSSISCX, BLOODCX  No results found for: BCIDPCR, CXREFLEX, CSFCX, CULTURETIS  No results found for: CULTURES, HSVCX, URCX  No results found for: EYECULTURE, GCCX, HSVCULTURE, LABHSV  No results found for: LEGIONELLA, MRSACX, MUMPSCX, MYCOPLASCX  No results found for: NOCARDIACX, STOOLCX  No results found for: THROATCX, UNSTIMCULT, URINECX, CULTURE, VZVCULTUR  No results found for: VIRALCULTU, WOUNDCX  Medication Reviewed and Will Continue Unless Documented in Plan:   atorvastatin, 40 mg, Oral, Nightly  budesonide, 0.5 mg, Nebulization, BID - RT  carvedilol, 6.25 mg, Oral, BID With Meals  cefTRIAXone, 1 g, Intravenous, Q24H  clopidogrel, 75 mg, Oral, Daily  escitalopram, 30 mg, Oral, Daily  furosemide, 40 mg, Intravenous, Once  furosemide, 60 mg, Intravenous, Once  heparin (porcine), 5,000 Units, Subcutaneous, Q8H  Insulin Aspart, 0-12 Units, Subcutaneous, 4x Daily AC & at Bedtime  isosorbide dinitrate, 15 mg, Oral, TID - Nitrates  methylPREDNISolone sodium succinate, 40 mg, Intravenous, Q12H  metroNIDAZOLE, 500 mg, Intravenous, Q8H  potassium chloride, 40 mEq, Per PEG Tube, Once  sodium chloride, 3 mL, Intracatheter, Q12H  sodium zirconium cyclosilicate, 10 g, Oral, Daily         •  acetaminophen **OR** acetaminophen **OR** acetaminophen  •  albuterol  •  ALPRAZolam  •  dextrose  •  guaifenesin  •  ipratropium-albuterol  •  ondansetron **OR** ondansetron  •  sodium  chloride  •  sodium chloride      Dietary Orders (From admission, onward)     Start     Ordered    01/06/23 1204  Diet: Renal Diets; Low Potassium; Texture: Pureed (NDD 1); Fluid Consistency: Thin (IDDSI 0)  Diet Effective Now        References:    Diet Order [place]   Question Answer Comment   Diets: Renal Diets    Renal Diet: Low Potassium    Texture: Pureed (NDD 1)    Fluid Consistency: Thin (IDDSI 0)        01/06/23 1204    01/05/23 1800  Dietary Nutrition Supplements Novasource Renal (Nepro)  Daily With Breakfast, Lunch & Dinner      Question:  Select Supplement:  Answer:  Novasource Renal (Nepro)    01/05/23 1426    01/05/23 1427  Diet, Tube Feeding Novasource Renal (Nepro); Tube Feeding Type: Cyclic / Intermittent; Feeding Start Time: 9:00 PM; Feeding End Time: 9:00 AM; Cyclic Feeding Start Rate (mL/hr): 35; To Goal Rate of (mL/hr): 35; Supplemental Bolus Feeding: No  Diet Effective Now        Question Answer Comment   Tube Feeding Formula: Novasource Renal (Nepro)    Tube Feeding Type Cyclic / Intermittent    Feeding Start Time: 9:00 PM    Feeding End Time: 9:00 AM    Cyclic Feeding Start Rate (mL/hr): 35    To Goal Rate of (mL/hr) 35    Supplemental Bolus Feeding: No    Water Flush Amount (mL) 30    Water Flush Frequency Every 1 Hour        01/05/23 1426              History, physical exam, assessment and plan may have been partly or fully copied from before, but  changes made to the copied record to reflect care on the date of service. Part of the lab and imaging  reports auto populated and corrected. Some of this note may be an electronic transcription of spoken  language to printed text. This may permit erroneous, or at times, nonsensical words or phrases to be  inadvertently transcribed. Although I have reviewed the note for such errors, some may still exist.      This document has been electronically signed by Josue Howe MD on January 7, 2023 14:22 CST

## 2023-01-07 NOTE — SIGNIFICANT NOTE
01/07/23 1115   OTHER   Discipline physical therapist   Rehab Time/Intention   Session Not Performed other (see comments)  (RN reports pt not appropriate for PT today; pt having chest pain and RN just placed pt on bipap. PT to follow-up 1/9/2023)

## 2023-01-08 NOTE — ACP (ADVANCE CARE PLANNING)
[facility]  INPATIENT PROGRESS NOTE    PATIENT NAME: Jonathan Williamson      PHYSICIAN: Josue Howe MD  : 1955        MRN: [id number]  Patient Care Team:  Provider, No Known as PCP - General    Chief Complaint: Patient was admitted after arrest and return of spontaneous circulation at [facility].  History of Present Illness: 67-year-old gentleman with history of COPD medic pneumothorax who had been admitted to hospital hospital after patient was found to have chest pain and had cardiac arrest patient had received cardiopulmonary resuscitation as per ACLS protocol with epinephrine which resulted in return of spontaneous circulation.  Patient was intubated and was on sedation.  Patient's ABG was reported 7.08/109 32.  Patient was also reported to be hypotensive and norepinephrine drip along with vasopressin drip.  Patient was covered with broad-spectrum IV antibiotics.  Patient was admitted to [facility] for further treatment.  Patient's condition gradually improved.  Patient was managed to be extubated however patient remains extremely weak and was not able to tolerate oral diet.  Patient was continued on antibiotics.  Due to patient's extreme critical illness along with weakness and deconditioning with requirement of Dobbhoff feedings patient was recommended admitted to our facility the discharge summary dictated on  had been reviewed as documented above.  At the time of evaluation patient was resting comfortably.  Patient denies any complaints.  No headache or blurry vision reported.  No chest pain palpitation reported.  Patient states he is having some chest tightness however he attributes to the CPR.  Patient also complains of feeling weak.  No nausea vomiting reported.  Patient's echocardiogram done on  suggestive of diastolic congestive heart failure.  Patient's EKG done on December suggestive of normal  sinus rhythm at 78 bpm with no acute ST changes noted.  No chamber enlargement has been identified.  Patient CT head was negative for any acute findings as well.  That was done on December 27, 2022.  Laboratory results prior to coming to her facility where from January 4, 2023 white count 12.9 hemoglobin 11.1 hematocrit 34.3 platelet count of 338.  Sodium 134 potassium 4.9 chloride 94 bicarb 39 BUN of 18 creatinine 0.4 calcium 8.4.  Patient will be admitted to our facility for further therapy especially try to get patient off of oxygen supplementation along with aggressive therapy and try to advance diet as tolerated.       Assessment       Acute on chronic hypoxic respiratory failure.  S/p cardiac arrest.  Septic shock, resolved.  Critical illness myopathy.  Grade diastolic dysfunction with congestive heart failure.  Hypothermia.  Coronary artery disease.  Chronic obstructive pulmonary disease exacerbation.  Hypertension  Dyslipidemia.  Depression.  DVT and GI prophylaxis.  Cachexia with BMI less than and severe protein calorie malnutrition.     DVT Prophylaxis: SCDs and JAYJAY stockings.  GI Prophylaxis: We will start Protonix.  Code Status: Full code.     Plan      -Rocephin as before  -Continue with IV steroids and nebulizer treatment.  -Continue oxygen supplementation wean as tolerated.  -Bi-PAP therapy nightly.    -No significant improvement in ABG's.    -Remains inconsistent with Bi-PAP.  Gives verbal understanding of risks.  -Strongly encourage compliance.  -Patient family understand patient prognosis and diagnosis.  -Therapy as patient tolerates and strongly recommend out of bed daily.  -Monitor labs closely.  -Continue tube feeding as patient tolerates.  -DVT and GI prophylaxis in place.  -We'll continue monitoring patient in hospital setting and treat patient as course dictates.  -Please review orders for detailed plan of care.  -For Aute and Chronic medical condition we will continue medications described  below in medication section which have been reviewed and we will continue unless changed in plan of care.  -Laboratory and diagnostic studies have been independently reviewed and the reports are reviewed as documented below.     Subjective   Interval History:   Patient Complaints: Patient seen and examined.  No overnight events reported.  Resting comfortably at this time.   History taken from: Chart, nursing staff.    Review of Systems:    Review of Systems   Constitutional: Positive for activity change and appetite change. Negative for chills, diaphoresis and fever.   HENT: Negative for congestion, rhinorrhea, sore throat and trouble swallowing.    Eyes: Negative for visual disturbance.   Respiratory: Negative for cough, chest tightness, shortness of breath and wheezing.    Cardiovascular: Negative for chest pain, palpitations and leg swelling.   Gastrointestinal: Negative for abdominal pain, blood in stool, diarrhea, nausea and vomiting.   Endocrine: Negative for cold intolerance and heat intolerance.   Genitourinary: Negative for decreased urine volume and difficulty urinating.   Musculoskeletal: Negative for back pain, gait problem and neck pain.   Skin: Negative for rash.   Neurological: Positive for weakness. Negative for dizziness, syncope, light-headedness, numbness and headaches.   Psychiatric/Behavioral: The patient is not nervous/anxious.        Objective   Vital Signs  Temp: 96.9 F         Blood Pressure: 150/72 Heart Rate: 75        Resp: 30           O2: 96 %    Physical Exam:   Physical Exam  Vitals and nursing note reviewed.   Constitutional:       Appearance: He is well-developed.   HENT:      Head: Normocephalic and atraumatic.      Nose: Nose normal.   Eyes:      Extraocular Movements: Extraocular movements intact.      Conjunctiva/sclera: Conjunctivae normal.      Pupils: Pupils are equal, round, and reactive to light.   Neck:      Thyroid: No thyromegaly.      Vascular: No JVD.      Trachea: No  tracheal deviation.   Cardiovascular:      Rate and Rhythm: Regular rhythm. Tachycardia present.      Heart sounds: Normal heart sounds.   Pulmonary:      Effort: Pulmonary effort is normal. No respiratory distress.      Breath sounds: Wheezing and rhonchi present. No rales.   Chest:      Chest wall: No tenderness.   Abdominal:      General: Bowel sounds are normal. There is no distension.      Palpations: Abdomen is soft.      Tenderness: There is no abdominal tenderness. There is no guarding or rebound.   Musculoskeletal:         General: Normal range of motion.      Cervical back: Normal range of motion and neck supple.   Lymphadenopathy:      Cervical: No cervical adenopathy.   Skin:     General: Skin is warm and dry.      Capillary Refill: Capillary refill takes 2 to 3 seconds.      Comments: Intact   Neurological:      Mental Status: He is alert and oriented to person, place, and time.      Cranial Nerves: No cranial nerve deficit.      Motor: Weakness present.      Gait: Gait abnormal.      Deep Tendon Reflexes: Reflexes are normal and symmetric.   Psychiatric:         Mood and Affect: Mood normal.         Behavior: Behavior normal.       Results Review:       Results from last 7 days   Lab Units 01/06/23  0700 01/05/23  0706 01/04/23  0611 01/03/23  0719 01/02/23  0626   SODIUM mmol/L  --  133* 134* 138 137   POTASSIUM mmol/L 4.5 5.3* 4.9 5.0 5.2*   CHLORIDE mmol/L  --  91* 94* 94* 93*   TOTAL CO2 mmol/L  --   --  39* 39* 43*   CO2 mmol/L  --  34.0*  --   --   --    BUN mg/dL  --  22 18 23 21   CREATININE mg/dL  --  0.47* 0.4* 0.5* 0.4*   GLUCOSE mg/dL  --  154*  --   --   --    CALCIUM mg/dL  --  8.6 8.4* 8.3* 7.9*   BILIRUBIN mg/dL  --  0.5 0.57 0.42 0.39   ALK PHOS U/L  --  102 83 100 90   ALT (SGPT) U/L  --  132* 138* 182* 224*   AST (SGOT) U/L  --  37 28 36 52*     Results from last 7 days   Lab Units 01/05/23  0706 01/04/23  1931 01/04/23  0611 01/03/23  0719 01/02/23  0626   MAGNESIUM mg/dL 2.0 1.8*  1.7* 1.8* 1.7*   PHOSPHORUS mg/dL  --   --  3.1 3.4 2.1*     Results from last 7 days   Lab Units 01/05/23  0706   WBC 10*3/mm3 13.98*   HEMOGLOBIN g/dL 12.2*   HEMATOCRIT % 38.6   PLATELETS 10*3/mm3 443     Lab Results   Component Value Date    TROPONINI 0.14 (H) 12/28/2022     pH, Arterial   Date Value Ref Range Status   01/07/2023 7.333 (L) 7.350 - 7.450 pH units Final     Comment:     84 Value below reference range     Results from last 7 days   Lab Units 01/02/23  0626   INR  1.18     Imaging Results (Most Recent)     Procedure Component Value Units Date/Time    XR Chest 1 View [[id number]] Collected: 01/05/23 1447     Updated: 01/05/23 1551    Narrative:      EXAM: XR CHEST 1 VIEW    HISTORY: Increased SOA, Z74.09 Other reduced mobility Z78.9 Other  specified health status    COMPARISON:    TECHNIQUE:   Portable view of the chest.    FINDINGS:   Bullous change in the right upper lung with coexisting streaky  change which may represent parenchymal scarring. There is feeding  tube in place with distal portion below the diaphragm..  Bilateral hyperinflation of the lung parenchyma due to COPD.  Probable chronic pleural parenchymal change in the left lateral  costophrenic angle.  The left lung remains well aerated. The heart size is within  normal limits. The mediastinal contours are within normal limits.  .  No evidence of mediastinal shift or pneumothorax.  Unremarkable visualized osseous structures.      Impression:      1.  COPD.  2.  Bullous change in the right upper lung with coexisting  streaky change which may represent parenchymal scarring.  3.  There is feeding tube in place with distal portion below the  diaphragm..          Electronically signed by:  Carl Mosley MD  1/5/2023 3:48 PM CST  Workstation: [id number]    XR Abdomen KUB [[id number]] Collected: 01/04/23 2322     Updated: 01/04/23 2351    Narrative:      PROCEDURE:    XR ABDOMEN 1 VIEW (KUB)  dated  1/4/2023 11:22 PM  CST    CLINICAL HISTORY:   Male  67 years of age.   New Admission Verify  NG Tube Feeding Placement    TECHNIQUE: Frontal view supine abdomen.    PREVIOUS STUDIES:  None Available    FINDINGS:      Feeding tube within the descending duodenum.  No intraperitoneal free air. Intraluminal gas throughout  nondilated small intestine and colon.  Limited assessment of the origin soft tissue planes.      Impression:      Feeding tube within the descending duodenum.    Electronically signed by:  Nasreen Camacho MD  1/4/2023 11:49 PM  [facility] Workstation: [id number]         No results found for: ACANTHNAEG, AFBCX, BPERTUSSISCX, BLOODCX  No results found for: BCIDPCR, CXREFLEX, CSFCX, CULTURETIS  No results found for: CULTURES, HSVCX, URCX  No results found for: EYECULTURE, GCCX, HSVCULTURE, LABHSV  No results found for: LEGIONELLA, MRSACX, MUMPSCX, MYCOPLASCX  No results found for: NOCARDIACX, STOOLCX  No results found for: THROATCX, UNSTIMCULT, URINECX, CULTURE, VZVCULTUR  No results found for: VIRALCULTU, WOUNDCX  Medication Reviewed and Will Continue Unless Documented in Plan:   atorvastatin, 40 mg, Oral, Nightly  budesonide, 0.5 mg, Nebulization, BID - RT  carvedilol, 6.25 mg, Oral, BID With Meals  cefTRIAXone, 1 g, Intravenous, Q24H  clopidogrel, 75 mg, Oral, Daily  escitalopram, 30 mg, Oral, Daily  furosemide, 60 mg, Intravenous, Once  heparin (porcine), 5,000 Units, Subcutaneous, Q8H  Insulin Aspart, 0-12 Units, Subcutaneous, 4x Daily AC & at Bedtime  isosorbide dinitrate, 15 mg, Oral, TID - Nitrates  methylPREDNISolone sodium succinate, 40 mg, Intravenous, Q12H  metroNIDAZOLE, 500 mg, Intravenous, Q8H  potassium chloride, 40 mEq, Per PEG Tube, Once  sodium chloride, 3 mL, Intracatheter, Q12H  sodium zirconium cyclosilicate, 10 g, Oral, Daily  traZODone, 25 mg, Per PEG Tube, Nightly         •  acetaminophen **OR** acetaminophen **OR** acetaminophen  •  albuterol  •  ALPRAZolam  •  dextrose  •  guaifenesin  •  ipratropium-albuterol  •  ondansetron **OR**  ondansetron  •  sodium chloride  •  sodium chloride      Dietary Orders (From admission, onward)     Start     Ordered    01/06/23 1204  Diet: Renal Diets; Low Potassium; Texture: Pureed (NDD 1); Fluid Consistency: Thin (IDDSI 0)  Diet Effective Now        References:    Diet Order [place]   Question Answer Comment   Diets: Renal Diets    Renal Diet: Low Potassium    Texture: Pureed (NDD 1)    Fluid Consistency: Thin (IDDSI 0)        01/06/23 1204    01/05/23 1800  Dietary Nutrition Supplements Novasource Renal (Nepro)  Daily With Breakfast, Lunch & Dinner      Question:  Select Supplement:  Answer:  Novasource Renal (Nepro)    01/05/23 1426    01/05/23 1427  Diet, Tube Feeding Novasource Renal (Nepro); Tube Feeding Type: Cyclic / Intermittent; Feeding Start Time: 9:00 PM; Feeding End Time: 9:00 AM; Cyclic Feeding Start Rate (mL/hr): 35; To Goal Rate of (mL/hr): 35; Supplemental Bolus Feeding: No  Diet Effective Now        Question Answer Comment   Tube Feeding Formula: Novasource Renal (Nepro)    Tube Feeding Type Cyclic / Intermittent    Feeding Start Time: 9:00 PM    Feeding End Time: 9:00 AM    Cyclic Feeding Start Rate (mL/hr): 35    To Goal Rate of (mL/hr) 35    Supplemental Bolus Feeding: No    Water Flush Amount (mL) 30    Water Flush Frequency Every 1 Hour        01/05/23 1426              History, physical exam, assessment and plan may have been partly or fully copied from before, but  changes made to the copied record to reflect care on the date of service. Part of the lab and imaging  reports auto populated and corrected. Some of this note may be an electronic transcription of spoken  language to printed text. This may permit erroneous, or at times, nonsensical words or phrases to be  inadvertently transcribed. Although I have reviewed the note for such errors, some may still exist.      This document has been electronically signed by Josue Howe MD on January 8, 2023 16:40 CST

## 2023-01-09 NOTE — ACP (ADVANCE CARE PLANNING)
[facility] @ [facility]  INPATIENT PROGRESS NOTE    PATIENT NAME: Jonathan Williamson      PHYSICIAN: Josue Howe MD  : 1955        MRN: [id number]  Patient Care Team:  Provider, No Known as PCP - General    Chief Complaint: Patient was admitted after arrest and return of spontaneous circulation at [facility].  History of Present Illness: 67-year-old gentleman with history of COPD medic pneumothorax who had been admitted to hospital hospital after patient was found to have chest pain and had cardiac arrest patient had received cardiopulmonary resuscitation as per ACLS protocol with epinephrine which resulted in return of spontaneous circulation.  Patient was intubated and was on sedation.  Patient's ABG was reported 7.08/109 32.  Patient was also reported to be hypotensive and norepinephrine drip along with vasopressin drip.  Patient was covered with broad-spectrum IV antibiotics.  Patient was admitted to [facility] for further treatment.  Patient's condition gradually improved.  Patient was managed to be extubated however patient remains extremely weak and was not able to tolerate oral diet.  Patient was continued on antibiotics.  Due to patient's extreme critical illness along with weakness and deconditioning with requirement of Dobbhoff feedings patient was recommended admitted to our facility the discharge summary dictated on  had been reviewed as documented above.  At the time of evaluation patient was resting comfortably.  Patient denies any complaints.  No headache or blurry vision reported.  No chest pain palpitation reported.  Patient states he is having some chest tightness however he attributes to the CPR.  Patient also complains of feeling weak.  No nausea vomiting reported.  Patient's echocardiogram done on  suggestive of diastolic congestive heart failure.  Patient's EKG done on December suggestive of normal  sinus rhythm at 78 bpm with no acute ST changes noted.  No chamber enlargement has been identified.  Patient CT head was negative for any acute findings as well.  That was done on December 27, 2022.  Laboratory results prior to coming to her facility where from January 4, 2023 white count 12.9 hemoglobin 11.1 hematocrit 34.3 platelet count of 338.  Sodium 134 potassium 4.9 chloride 94 bicarb 39 BUN of 18 creatinine 0.4 calcium 8.4.  Patient will be admitted to our facility for further therapy especially try to get patient off of oxygen supplementation along with aggressive therapy and try to advance diet as tolerated.       Assessment       Acute on chronic hypoxic respiratory failure.  S/p cardiac arrest.  Septic shock, resolved.  Critical illness myopathy.  Grade diastolic dysfunction with congestive heart failure.  Hypothermia.  Coronary artery disease.  Chronic obstructive pulmonary disease exacerbation.  Hypertension  Dyslipidemia.  Depression.  DVT and GI prophylaxis.  Cachexia with BMI less than and severe protein calorie malnutrition.     DVT Prophylaxis: SCDs and JAYJAY stockings.  GI Prophylaxis: We will start Protonix.  Code Status: Full code.     Plan      -Rocephin as before  -Continue with IV steroids and nebulizer treatment.  -Continue oxygen supplementation wean as tolerated.  -Bi-PAP therapy nightly.    -Leukocytosis at 24.22.  No fever or chills  -We will obtain CXR  -Remains inconsistent with Bi-PAP.  Gives verbal understanding of risks.  -Strongly encourage compliance.  -Patient family understand patient prognosis and diagnosis.  -Therapy as patient tolerates and strongly recommend out of bed daily.  -Monitor labs closely.  -Continue tube feeding as patient tolerates.  -DVT and GI prophylaxis in place.  -We'll continue monitoring patient in hospital setting and treat patient as course dictates.  -Please review orders for detailed plan of care.  -For Aute and Chronic medical condition we will  continue medications described below in medication section which have been reviewed and we will continue unless changed in plan of care.  -Laboratory and diagnostic studies have been independently reviewed and the reports are reviewed as documented below.     Subjective   Interval History:   Patient Complaints: Patient seen and examined.  Lying in bed on 5lpm oxygen.  No concerns.  No overnight events noted.   History taken from: Chart, nursing staff.    Review of Systems:    Review of Systems   Constitutional: Positive for activity change and appetite change. Negative for chills, diaphoresis and fever.   HENT: Negative for congestion, rhinorrhea, sore throat and trouble swallowing.    Eyes: Negative for visual disturbance.   Respiratory: Negative for cough, chest tightness, shortness of breath and wheezing.    Cardiovascular: Negative for chest pain, palpitations and leg swelling.   Gastrointestinal: Negative for abdominal pain, blood in stool, diarrhea, nausea and vomiting.   Endocrine: Negative for cold intolerance and heat intolerance.   Genitourinary: Negative for decreased urine volume and difficulty urinating.   Musculoskeletal: Negative for back pain, gait problem and neck pain.   Skin: Negative for rash.   Neurological: Positive for weakness. Negative for dizziness, syncope, light-headedness, numbness and headaches.   Psychiatric/Behavioral: The patient is not nervous/anxious.        Objective   Vital Signs  Temp: 97.8 F         Blood Pressure: 136/74    Heart Rate: 88        Resp: 20          O2: 91 %    Physical Exam:   Physical Exam  Vitals and nursing note reviewed.   Constitutional:       Appearance: He is well-developed.   HENT:      Head: Normocephalic and atraumatic.      Nose: Nose normal.   Eyes:      Extraocular Movements: Extraocular movements intact.      Conjunctiva/sclera: Conjunctivae normal.      Pupils: Pupils are equal, round, and reactive to light.   Neck:      Thyroid: No thyromegaly.       Vascular: No JVD.      Trachea: No tracheal deviation.   Cardiovascular:      Rate and Rhythm: Regular rhythm. Tachycardia present.      Heart sounds: Normal heart sounds.   Pulmonary:      Effort: Pulmonary effort is normal. No respiratory distress.      Breath sounds: Wheezing and rhonchi present. No rales.   Chest:      Chest wall: No tenderness.   Abdominal:      General: Bowel sounds are normal. There is no distension.      Palpations: Abdomen is soft.      Tenderness: There is no abdominal tenderness. There is no guarding or rebound.   Musculoskeletal:         General: Normal range of motion.      Cervical back: Normal range of motion and neck supple.   Lymphadenopathy:      Cervical: No cervical adenopathy.   Skin:     General: Skin is warm and dry.      Capillary Refill: Capillary refill takes 2 to 3 seconds.      Comments: Intact   Neurological:      Mental Status: He is alert and oriented to person, place, and time.      Cranial Nerves: No cranial nerve deficit.      Motor: Weakness present.      Gait: Gait abnormal.      Deep Tendon Reflexes: Reflexes are normal and symmetric.   Psychiatric:         Mood and Affect: Mood normal.         Behavior: Behavior normal.       Results Review:       Results from last 7 days   Lab Units 01/09/23  0608 01/06/23  0700 01/05/23  0706 01/04/23  0611 01/03/23  0719   SODIUM mmol/L 144  --  133* 134* 138   POTASSIUM mmol/L 3.9 4.5 5.3* 4.9 5.0   CHLORIDE mmol/L 93*  --  91* 94* 94*   TOTAL CO2 mmol/L  --   --   --  39* 39*   CO2 mmol/L 45.0*  --  34.0*  --   --    BUN mg/dL 62*  --  22 18 23   CREATININE mg/dL 0.58*  --  0.47* 0.4* 0.5*   GLUCOSE mg/dL 85  --  154*  --   --    CALCIUM mg/dL 8.8  --  8.6 8.4* 8.3*   BILIRUBIN mg/dL 0.6  --  0.5 0.57 0.42   ALK PHOS U/L 88  --  102 83 100   ALT (SGPT) U/L 56*  --  132* 138* 182*   AST (SGOT) U/L 21  --  37 28 36     Results from last 7 days   Lab Units 01/09/23  0608 01/05/23  0706 01/04/23  1931 01/04/23  0611  01/03/23  0719   MAGNESIUM mg/dL 2.5* 2.0 1.8* 1.7* 1.8*   PHOSPHORUS mg/dL  --   --   --  3.1 3.4     Results from last 7 days   Lab Units 01/09/23  0608 01/05/23  0706   WBC 10*3/mm3 24.22* 13.98*   HEMOGLOBIN g/dL 12.1* 12.2*   HEMATOCRIT % 38.3 38.6   PLATELETS 10*3/mm3 385 443     Lab Results   Component Value Date    TROPONINI 0.14 (H) 12/28/2022     pH, Arterial   Date Value Ref Range Status   01/09/2023 7.478 (H) 7.350 - 7.450 pH units Final     Comment:     83 Value above reference range     CO2   Date Value Ref Range Status   01/09/2023 45.0 (H) 22.0 - 29.0 mmol/L Final         Imaging Results (Most Recent)     Procedure Component Value Units Date/Time    XR Chest 1 View [[id number]] Collected: 01/05/23 1447     Updated: 01/05/23 1551    Narrative:      EXAM: XR CHEST 1 VIEW    HISTORY: Increased SOA, Z74.09 Other reduced mobility Z78.9 Other  specified health status    COMPARISON:    TECHNIQUE:   Portable view of the chest.    FINDINGS:   Bullous change in the right upper lung with coexisting streaky  change which may represent parenchymal scarring. There is feeding  tube in place with distal portion below the diaphragm..  Bilateral hyperinflation of the lung parenchyma due to COPD.  Probable chronic pleural parenchymal change in the left lateral  costophrenic angle.  The left lung remains well aerated. The heart size is within  normal limits. The mediastinal contours are within normal limits.  .  No evidence of mediastinal shift or pneumothorax.  Unremarkable visualized osseous structures.      Impression:      1.  COPD.  2.  Bullous change in the right upper lung with coexisting  streaky change which may represent parenchymal scarring.  3.  There is feeding tube in place with distal portion below the  diaphragm..          Electronically signed by:  Carl Mosley MD  1/5/2023 3:48 PM CST  Workstation: [id number]    XR Abdomen KUB [[id number]] Collected: 01/04/23 2322     Updated: 01/04/23 2351    Narrative:       PROCEDURE:    XR ABDOMEN 1 VIEW (KUB)  dated  1/4/2023 11:22 PM  CST    CLINICAL HISTORY:   Male 67 years of age.   New Admission Verify  NG Tube Feeding Placement    TECHNIQUE: Frontal view supine abdomen.    PREVIOUS STUDIES:  None Available    FINDINGS:      Feeding tube within the descending duodenum.  No intraperitoneal free air. Intraluminal gas throughout  nondilated small intestine and colon.  Limited assessment of the origin soft tissue planes.      Impression:      Feeding tube within the descending duodenum.    Electronically signed by:  Nasreen Camacho MD  1/4/2023 11:49 PM  CST Workstation: [id number]         No results found for: ACANTHNAEG, AFBCX, BPERTUSSISCX, BLOODCX  No results found for: BCIDPCR, CXREFLEX, CSFCX, CULTURETIS  No results found for: CULTURES, HSVCX, URCX  No results found for: EYECULTURE, GCCX, HSVCULTURE, LABHSV  No results found for: LEGIONELLA, MRSACX, MUMPSCX, MYCOPLASCX  No results found for: NOCARDIACX, STOOLCX  No results found for: THROATCX, UNSTIMCULT, URINECX, CULTURE, VZVCULTUR  No results found for: VIRALCULTU, WOUNDCX  Medication Reviewed and Will Continue Unless Documented in Plan:   atorvastatin, 40 mg, Oral, Nightly  budesonide, 0.5 mg, Nebulization, BID - RT  carvedilol, 6.25 mg, Oral, BID With Meals  cefTRIAXone, 1 g, Intravenous, Q24H  clopidogrel, 75 mg, Oral, Daily  escitalopram, 30 mg, Oral, Daily  furosemide, 60 mg, Intravenous, Once  heparin (porcine), 5,000 Units, Subcutaneous, Q8H  Insulin Aspart, 0-12 Units, Subcutaneous, 4x Daily AC & at Bedtime  isosorbide dinitrate, 15 mg, Oral, TID - Nitrates  methylPREDNISolone sodium succinate, 40 mg, Intravenous, Q12H  metroNIDAZOLE, 500 mg, Intravenous, Q8H  potassium chloride, 40 mEq, Per PEG Tube, Once  sodium chloride, 3 mL, Intracatheter, Q12H  sodium zirconium cyclosilicate, 10 g, Oral, Daily  traZODone, 25 mg, Per PEG Tube, Nightly         •  acetaminophen **OR** acetaminophen **OR** acetaminophen  •   albuterol  •  ALPRAZolam  •  dextrose  •  guaifenesin  •  ipratropium-albuterol  •  ondansetron **OR** ondansetron  •  sodium chloride  •  sodium chloride      Dietary Orders (From admission, onward)     Start     Ordered    01/06/23 1204  Diet: Renal Diets; Low Potassium; Texture: Pureed (NDD 1); Fluid Consistency: Thin (IDDSI 0)  Diet Effective Now        References:    Diet Order [place]   Question Answer Comment   Diets: Renal Diets    Renal Diet: Low Potassium    Texture: Pureed (NDD 1)    Fluid Consistency: Thin (IDDSI 0)        01/06/23 1204    01/05/23 1800  Dietary Nutrition Supplements Novasource Renal (Nepro)  Daily With Breakfast, Lunch & Dinner      Question:  Select Supplement:  Answer:  Novasource Renal (Nepro)    01/05/23 1426    01/05/23 1427  Diet, Tube Feeding Novasource Renal (Nepro); Tube Feeding Type: Cyclic / Intermittent; Feeding Start Time: 9:00 PM; Feeding End Time: 9:00 AM; Cyclic Feeding Start Rate (mL/hr): 35; To Goal Rate of (mL/hr): 35; Supplemental Bolus Feeding: No  Diet Effective Now        Question Answer Comment   Tube Feeding Formula: Novasource Renal (Nepro)    Tube Feeding Type Cyclic / Intermittent    Feeding Start Time: 9:00 PM    Feeding End Time: 9:00 AM    Cyclic Feeding Start Rate (mL/hr): 35    To Goal Rate of (mL/hr) 35    Supplemental Bolus Feeding: No    Water Flush Amount (mL) 30    Water Flush Frequency Every 1 Hour        01/05/23 1426              History, physical exam, assessment and plan may have been partly or fully copied from before, but  changes made to the copied record to reflect care on the date of service. Part of the lab and imaging  reports auto populated and corrected. Some of this note may be an electronic transcription of spoken  language to printed text. This may permit erroneous, or at times, nonsensical words or phrases to be  inadvertently transcribed. Although I have reviewed the note for such errors, some may still exist.      This document  has been electronically signed by Josue Howe MD on January 9, 2023 09:49 CST

## 2023-01-10 NOTE — ACP (ADVANCE CARE PLANNING)
RD received consult for TF: NG tube continues in place but held since 1/7 r/t requiring bipap. Has pureed diet order but unable to come of bipap to eat (per RN).     Wounds to bilateral buttocks, coccyx and bilateral heels.     Alb 3.3L, bun/cr acceptable    Noted wt in EPIC notes 3/2022 135# and 1/5/23 100#. Pt is 66in w/ #  CBW: 93#    Pt met criteria for severe, chronic malnutrition 1/5- see RD n ote.      Rec Diabetisource to goal rate 60ml/hr with 25ml Q2hr (+300ml) to provide 1728cal, 86g pro and 1180ml (+300 ml flushes) totals 1480ml fluids.      RD to follow

## 2023-01-10 NOTE — ACP (ADVANCE CARE PLANNING)
[facility] @ [facility]  INPATIENT PROGRESS NOTE    PATIENT NAME: Jonathan Williamson      PHYSICIAN: Josue oHwe MD  : 1955        MRN: [id number]  Patient Care Team:  Provider, No Known as PCP - General    Chief Complaint: Patient was admitted after arrest and return of spontaneous circulation at [facility].  History of Present Illness: 67-year-old gentleman with history of COPD medic pneumothorax who had been admitted to hospital hospital after patient was found to have chest pain and had cardiac arrest patient had received cardiopulmonary resuscitation as per ACLS protocol with epinephrine which resulted in return of spontaneous circulation.  Patient was intubated and was on sedation.  Patient's ABG was reported 7.08/109 32.  Patient was also reported to be hypotensive and norepinephrine drip along with vasopressin drip.  Patient was covered with broad-spectrum IV antibiotics.  Patient was admitted to [facility] for further treatment.  Patient's condition gradually improved.  Patient was managed to be extubated however patient remains extremely weak and was not able to tolerate oral diet.  Patient was continued on antibiotics.  Due to patient's extreme critical illness along with weakness and deconditioning with requirement of Dobbhoff feedings patient was recommended admitted to our facility the discharge summary dictated on  had been reviewed as documented above.  At the time of evaluation patient was resting comfortably.  Patient denies any complaints.  No headache or blurry vision reported.  No chest pain palpitation reported.  Patient states he is having some chest tightness however he attributes to the CPR.  Patient also complains of feeling weak.  No nausea vomiting reported.  Patient's echocardiogram done on  suggestive of diastolic congestive heart failure.  Patient's EKG done on December suggestive of normal  sinus rhythm at 78 bpm with no acute ST changes noted.  No chamber enlargement has been identified.  Patient CT head was negative for any acute findings as well.  That was done on December 27, 2022.  Laboratory results prior to coming to her facility where from January 4, 2023 white count 12.9 hemoglobin 11.1 hematocrit 34.3 platelet count of 338.  Sodium 134 potassium 4.9 chloride 94 bicarb 39 BUN of 18 creatinine 0.4 calcium 8.4.  Patient will be admitted to our facility for further therapy especially try to get patient off of oxygen supplementation along with aggressive therapy and try to advance diet as tolerated.       Assessment       Acute on chronic hypoxic respiratory failure.  S/p cardiac arrest.  Septic shock, resolved.  Critical illness myopathy.  Grade diastolic dysfunction with congestive heart failure.  Hypothermia.  Coronary artery disease.  Chronic obstructive pulmonary disease exacerbation.  Hypertension  Dyslipidemia.  Depression.  DVT and GI prophylaxis.  Cachexia with BMI less than and severe protein calorie malnutrition.     DVT Prophylaxis: SCDs and JAYJAY stockings.  GI Prophylaxis: We will start Protonix.  Code Status: Full code.     Plan      -Rocephin as before.  -Continue with IV steroids and nebulizer treatment as before.  -Continue oxygen supplementation wean as tolerated.  -Bi-PAP therapy nightly.    -Leukocytosis at 24.22.  No fever or chills  -Remains inconsistent with Bi-PAP.  Gives verbal understanding of risks.  -Continue strongly encourage compliance.  -Patient family educated and understands patient prognosis and diagnosis.  -Therapy as patient tolerates and strongly recommend out of bed daily as tolerated.  -Monitor labs closely.  -Continue tube feeding as patient tolerates.  -DVT and GI prophylaxis in place.  -We'll continue monitoring patient in hospital setting and treat patient as course dictates.  -Please review orders for detailed plan of care.  -For Aute and Chronic medical  condition we will continue medications described below in medication section which have been reviewed and we will continue unless changed in plan of care.  -Laboratory and diagnostic studies have been independently reviewed and the reports are reviewed as documented below.     Subjective   Interval History:   Patient Complaints: Patient examined. Resting in bed on bi-pap. No overnight events reported. No complaints at this time.   History taken from: Chart, nursing staff.    Review of Systems:    Review of Systems   Constitutional: Positive for activity change and appetite change. Negative for chills, diaphoresis and fever.   HENT: Negative for congestion, rhinorrhea, sore throat and trouble swallowing.    Eyes: Negative for visual disturbance.   Respiratory: Negative for cough, chest tightness, shortness of breath and wheezing.    Cardiovascular: Negative for chest pain, palpitations and leg swelling.   Gastrointestinal: Negative for abdominal pain, blood in stool, diarrhea, nausea and vomiting.   Endocrine: Negative for cold intolerance and heat intolerance.   Genitourinary: Negative for decreased urine volume and difficulty urinating.   Musculoskeletal: Negative for back pain, gait problem and neck pain.   Skin: Negative for rash.   Neurological: Positive for weakness. Negative for dizziness, syncope, light-headedness, numbness and headaches.   Psychiatric/Behavioral: The patient is not nervous/anxious.        Objective   Vital Signs  Temp: 97.4F         Blood Pressure: 146/72    Heart Rate: 94        Resp: 29         O2: 97 %    Physical Exam:   Physical Exam  Vitals and nursing note reviewed.   Constitutional:       Appearance: He is well-developed.   HENT:      Head: Normocephalic and atraumatic.      Nose: Nose normal.   Eyes:      Extraocular Movements: Extraocular movements intact.      Conjunctiva/sclera: Conjunctivae normal.      Pupils: Pupils are equal, round, and reactive to light.   Neck:       Thyroid: No thyromegaly.      Vascular: No JVD.      Trachea: No tracheal deviation.   Cardiovascular:      Rate and Rhythm: Regular rhythm. Tachycardia present.      Heart sounds: Normal heart sounds.   Pulmonary:      Effort: Pulmonary effort is normal. No respiratory distress.      Breath sounds: Wheezing and rhonchi present. No rales.   Chest:      Chest wall: No tenderness.   Abdominal:      General: Bowel sounds are normal. There is no distension.      Palpations: Abdomen is soft.      Tenderness: There is no abdominal tenderness. There is no guarding or rebound.   Musculoskeletal:         General: Normal range of motion.      Cervical back: Normal range of motion and neck supple.   Lymphadenopathy:      Cervical: No cervical adenopathy.   Skin:     General: Skin is warm and dry.      Capillary Refill: Capillary refill takes 2 to 3 seconds.      Comments: Intact   Neurological:      Mental Status: He is alert and oriented to person, place, and time.      Cranial Nerves: No cranial nerve deficit.      Motor: Weakness present.      Gait: Gait abnormal.      Deep Tendon Reflexes: Reflexes are normal and symmetric.   Psychiatric:         Mood and Affect: Mood normal.         Behavior: Behavior normal.       Results Review:       Results from last 7 days   Lab Units 01/09/23  0608 01/06/23  0700 01/05/23  0706 01/04/23  0611   SODIUM mmol/L 144  --  133* 134*   POTASSIUM mmol/L 3.9 4.5 5.3* 4.9   CHLORIDE mmol/L 93*  --  91* 94*   TOTAL CO2 mmol/L  --   --   --  39*   CO2 mmol/L 45.0*  --  34.0*  --    BUN mg/dL 62*  --  22 18   CREATININE mg/dL 0.58*  --  0.47* 0.4*   GLUCOSE mg/dL 85  --  154*  --    CALCIUM mg/dL 8.8  --  8.6 8.4*   BILIRUBIN mg/dL 0.6  --  0.5 0.57   ALK PHOS U/L 88  --  102 83   ALT (SGPT) U/L 56*  --  132* 138*   AST (SGOT) U/L 21  --  37 28     Results from last 7 days   Lab Units 01/09/23  0608 01/05/23  0706 01/04/23  1931 01/04/23  0611   MAGNESIUM mg/dL 2.5* 2.0 1.8* 1.7*   PHOSPHORUS  mg/dL  --   --   --  3.1     Results from last 7 days   Lab Units 01/09/23  0608 01/05/23  0706   WBC 10*3/mm3 24.22* 13.98*   HEMOGLOBIN g/dL 12.1* 12.2*   HEMATOCRIT % 38.3 38.6   PLATELETS 10*3/mm3 385 443     Lab Results   Component Value Date    TROPONINI 0.14 (H) 12/28/2022     pH, Arterial   Date Value Ref Range Status   01/09/2023 7.478 (H) 7.350 - 7.450 pH units Final     Comment:     83 Value above reference range     CO2   Date Value Ref Range Status   01/09/2023 45.0 (H) 22.0 - 29.0 mmol/L Final         Imaging Results (Most Recent)     Procedure Component Value Units Date/Time    XR Chest 1 View [[id number]] Collected: 01/09/23 1044     Updated: 01/09/23 1729    Narrative:          COMPARISON:     1/5/2023    INDICATION:     Shortness of air    FINDINGS:     Portable AP chest radiograph is obtained.  There is  a right upper extremity PICC line, with its tip last seen at the  level of the distal superior vena cava. There is a nasoenteric  catheter which extends below the inferior aspect of the image.   The cardiomediastinal silhouette appears normal size and  configuration.  The pulmonary vasculature is within normal  limits.  Lungs are hyperlucent. There is unchanged right upper  lobe parenchymal opacity with distortion. There are increased  right mid lung and basilar airspace opacities. Minimal blunting  right costophrenic angle. Minimal blunting left costophrenic  angle. No pneumothorax.      Impression:      1. Interval placement of a right upper extremity PICC line. Its  tip is not well visualized as it is obscured by overlying tubes,  but appears to be at the distal superior vena cava.  2. Worsening right mid lung and basilar airspace opacities.  Possible small left effusion.      3. Right upper lobe parenchymal consolidation with distortion.    Electronically signed by:  Ludwig Aguilera MD  1/9/2023 5:26 PM CST  Workstation: [id number]    XR Chest 1 View [[id number]] Collected: 01/05/23 1881     Updated:  01/05/23 1551    Narrative:      EXAM: XR CHEST 1 VIEW    HISTORY: Increased SOA, Z74.09 Other reduced mobility Z78.9 Other  specified health status    COMPARISON:    TECHNIQUE:   Portable view of the chest.    FINDINGS:   Bullous change in the right upper lung with coexisting streaky  change which may represent parenchymal scarring. There is feeding  tube in place with distal portion below the diaphragm..  Bilateral hyperinflation of the lung parenchyma due to COPD.  Probable chronic pleural parenchymal change in the left lateral  costophrenic angle.  The left lung remains well aerated. The heart size is within  normal limits. The mediastinal contours are within normal limits.  .  No evidence of mediastinal shift or pneumothorax.  Unremarkable visualized osseous structures.      Impression:      1.  COPD.  2.  Bullous change in the right upper lung with coexisting  streaky change which may represent parenchymal scarring.  3.  There is feeding tube in place with distal portion below the  diaphragm..          Electronically signed by:  Carl Mosley MD  1/5/2023 3:48 PM CST  Workstation: [id number]    XR Abdomen KUB [[id number]] Collected: 01/04/23 2322     Updated: 01/04/23 2351    Narrative:      PROCEDURE:    XR ABDOMEN 1 VIEW (KUB)  dated  1/4/2023 11:22 PM  CST    CLINICAL HISTORY:   Male 67 years of age.   New Admission Verify  NG Tube Feeding Placement    TECHNIQUE: Frontal view supine abdomen.    PREVIOUS STUDIES:  None Available    FINDINGS:      Feeding tube within the descending duodenum.  No intraperitoneal free air. Intraluminal gas throughout  nondilated small intestine and colon.  Limited assessment of the origin soft tissue planes.      Impression:      Feeding tube within the descending duodenum.    Electronically signed by:  Nasreen Camacho MD  1/4/2023 11:49 PM  CST Workstation: [id number]         No results found for: ACANTHNAEG, AFBCX, BPERTUSSISCX, BLOODCX  No results found for: BCIDPCR, CXREFLEX,  CSFCX, CULTURETIS  No results found for: CULTURES, HSVCX, URCX  No results found for: EYECULTURE, GCCX, HSVCULTURE, LABHSV  No results found for: LEGIONELLA, MRSACX, MUMPSCX, MYCOPLASCX  No results found for: NOCARDIACX, STOOLCX  No results found for: THROATCX, UNSTIMCULT, URINECX, CULTURE, VZVCULTUR  No results found for: VIRALCULTU, WOUNDCX  Medication Reviewed and Will Continue Unless Documented in Plan:   atorvastatin, 40 mg, Oral, Nightly  budesonide, 0.5 mg, Nebulization, BID - RT  carvedilol, 6.25 mg, Oral, BID With Meals  cefTRIAXone, 1 g, Intravenous, Q24H  clopidogrel, 75 mg, Oral, Daily  escitalopram, 30 mg, Oral, Daily  furosemide, 60 mg, Intravenous, Once  heparin (porcine), 5,000 Units, Subcutaneous, Q8H  Insulin Aspart, 0-12 Units, Subcutaneous, 4x Daily AC & at Bedtime  isosorbide dinitrate, 15 mg, Oral, TID - Nitrates  methylPREDNISolone sodium succinate, 40 mg, Intravenous, Q12H  metroNIDAZOLE, 500 mg, Intravenous, Q8H  potassium chloride, 40 mEq, Per PEG Tube, Once  sodium chloride, 3 mL, Intracatheter, Q12H  sodium zirconium cyclosilicate, 10 g, Oral, Daily  traZODone, 25 mg, Per PEG Tube, Nightly         •  acetaminophen **OR** acetaminophen **OR** acetaminophen  •  albuterol  •  ALPRAZolam  •  dextrose  •  guaifenesin  •  ipratropium-albuterol  •  ondansetron **OR** ondansetron  •  sodium chloride  •  sodium chloride      Dietary Orders (From admission, onward)     Start     Ordered    01/06/23 1204  Diet: Renal Diets; Low Potassium; Texture: Pureed (NDD 1); Fluid Consistency: Thin (IDDSI 0)  Diet Effective Now        References:    Diet Order [place]   Question Answer Comment   Diets: Renal Diets    Renal Diet: Low Potassium    Texture: Pureed (NDD 1)    Fluid Consistency: Thin (IDDSI 0)        01/06/23 1204    01/05/23 1800  Dietary Nutrition Supplements Novasource Renal (Nepro)  Daily With Breakfast, Lunch & Dinner      Question:  Select Supplement:  Answer:  Novasource Renal (Nepro)     01/05/23 1426    01/05/23 1427  Diet, Tube Feeding Novasource Renal (Nepro); Tube Feeding Type: Cyclic / Intermittent; Feeding Start Time: 9:00 PM; Feeding End Time: 9:00 AM; Cyclic Feeding Start Rate (mL/hr): 35; To Goal Rate of (mL/hr): 35; Supplemental Bolus Feeding: No  Diet Effective Now        Question Answer Comment   Tube Feeding Formula: Novasource Renal (Nepro)    Tube Feeding Type Cyclic / Intermittent    Feeding Start Time: 9:00 PM    Feeding End Time: 9:00 AM    Cyclic Feeding Start Rate (mL/hr): 35    To Goal Rate of (mL/hr) 35    Supplemental Bolus Feeding: No    Water Flush Amount (mL) 30    Water Flush Frequency Every 1 Hour        01/05/23 1426              History, physical exam, assessment and plan may have been partly or fully copied from before, but  changes made to the copied record to reflect care on the date of service. Part of the lab and imaging  reports auto populated and corrected. Some of this note may be an electronic transcription of spoken  language to printed text. This may permit erroneous, or at times, nonsensical words or phrases to be  inadvertently transcribed. Although I have reviewed the note for such errors, some may still exist.      This document has been electronically signed by Josue Howe MD on January 10, 2023 08:40 CST

## 2023-01-12 NOTE — ACP (ADVANCE CARE PLANNING)
"[facility]  [address] [place] [address]. [address]  T - [phone]  F - [phone]     Consultation         PATIENT  DEMOGRAPHICS   PATIENT NAME: Jonathan Williamson                      PHYSICIAN: Hossein URENA ALICE Simpson  : 1955  MRN: [id number]    Subjective   SUBJECTIVE   Referring Provider: Dr. Howe  Reason for Consultation: Hypernatremia  History of present illness: This is a 67-year-old male with a past medical history significant for hypertension, COPD, CAD, diastolic dysfunction, CHF who was initially admitted to an outside facility [facility] after cardiac arrest and subsequent ROSC.  Following ROSC he was intubated and on sedation.  He was also hypotensive and required vasopressors.  He was treated for sepsis with broad-spectrum antibiotics.  His condition did improve throughout his hospitalization and he was extubated.  Unfortunately he remained quite weak and was unable to tolerate diet.  He has required Dobbhoff feedings and continued noninvasive ventilation support.  He was transferred to continue care for further management.  He is now found to have some hypernatremia and nephrology is consulted to help manage.    No past medical history on file.  No past surgical history on file.  No family history on file.     Allergies:  Lisinopril and Penicillins     REVIEW OF SYSTEMS    Review of Systems   Respiratory: Positive for shortness of breath.    All other systems reviewed and are negative.      Objective   OBJECTIVE   Vital Signs  Heart Rate:  [83-87] 87  T 97.0  HR 89  RR 21  /71  O2 93%      Flowsheet Rows    Flowsheet Row First Filed Value   Admission Height 165 cm (64.96\") Documented at 2023   Admission Weight 45.5 kg (100 lb 5 oz) Documented at 2023           No intake/output data recorded.    PHYSICAL EXAM    Physical Exam  Constitutional:       Appearance: He is well-developed. He is ill-appearing.   HENT:      Head: Normocephalic and atraumatic. "   Eyes:      Conjunctiva/sclera: Conjunctivae normal.      Pupils: Pupils are equal, round, and reactive to light.   Cardiovascular:      Rate and Rhythm: Normal rate and regular rhythm.   Pulmonary:      Effort: Pulmonary effort is normal.      Breath sounds: Normal breath sounds.   Abdominal:      Palpations: Abdomen is soft.   Musculoskeletal:      Cervical back: Neck supple.      Right lower leg: No edema.      Left lower leg: No edema.   Skin:     General: Skin is warm and dry.   Neurological:      Mental Status: He is alert and oriented to person, place, and time.   Psychiatric:         Mood and Affect: Mood normal.         Behavior: Behavior normal.         RESULTS   Results Review:    Results from last 7 days   Lab Units 01/12/23  0631 01/09/23  0608 01/06/23  0700   SODIUM mmol/L 151* 144  --    POTASSIUM mmol/L 4.2 3.9 4.5   CHLORIDE mmol/L 101 93*  --    CO2 mmol/L 50.0* 45.0*  --    BUN mg/dL 49* 62*  --    CREATININE mg/dL 0.52* 0.58*  --    CALCIUM mg/dL 8.3* 8.8  --    BILIRUBIN mg/dL 0.3 0.6  --    ALK PHOS U/L 120* 88  --    ALT (SGPT) U/L 37 56*  --    AST (SGOT) U/L 24 21  --    GLUCOSE mg/dL 274* 85  --        Estimated Creatinine Clearance: 89.9 mL/min (A) (by C-G formula based on SCr of 0.52 mg/dL (L)).    Results from last 7 days   Lab Units 01/12/23  0631 01/09/23  0608   MAGNESIUM mg/dL 2.5* 2.5*             Results from last 7 days   Lab Units 01/12/23  0631 01/09/23  0608   WBC 10*3/mm3 11.83* 24.22*   HEMOGLOBIN g/dL 10.4* 12.1*   PLATELETS 10*3/mm3 302 385              MEDICATIONS    atorvastatin, 40 mg, Oral, Nightly  budesonide, 0.5 mg, Nebulization, BID - RT  carvedilol, 6.25 mg, Oral, BID With Meals  cefTRIAXone, 1 g, Intravenous, Q24H  clopidogrel, 75 mg, Oral, Daily  escitalopram, 30 mg, Oral, Daily  furosemide, 60 mg, Intravenous, Once  heparin (porcine), 5,000 Units, Subcutaneous, Q8H  Insulin Aspart, 0-12 Units, Subcutaneous, 4x Daily AC & at Bedtime  isosorbide dinitrate, 15  mg, Oral, TID - Nitrates  methylPREDNISolone sodium succinate, 40 mg, Intravenous, Q12H  metroNIDAZOLE, 500 mg, Intravenous, Q8H  potassium chloride, 40 mEq, Per PEG Tube, Once  sodium chloride, 3 mL, Intracatheter, Q12H  sodium zirconium cyclosilicate, 10 g, Oral, Daily  traZODone, 25 mg, Per PEG Tube, Nightly         No medications prior to admission.     Assessment & Plan   ASSESSMENT / PLAN      * No active hospital problems. *    1.  Hypernatremia- sodium up to 151, appears dry on exam.  Increase free water with tube feeds to 50 mils per hour.  Daily BMP for 3 days.  BUN is also elevated suggesting prerenal azotemia. Limit diuretic use due to high na    2.  Acute on chronic hypoxic respiratory failure- continue noninvasive ventilatory support, manage per primary team    3.  COPD exacerbation    4.  S/p cardiac arrest    5.  CAD    6.  Diastolic dysfunction/CHF    7.  Cachexia/malnutrition- continue tube feeds per primary team      Thank you for the consult, we will continue to follow this patient.         I discussed the patients findings and my recommendations with patient and family      This document has been electronically signed by ALICE Waller on January 12, 2023 13:06 CST      For this patient encounter, I have reviewed the Nurse Practitioner's documentation, medical decision making, and treatment plan and personally spent time with the patient. Patient is seen on 1/12/23

## 2023-01-12 NOTE — ACP (ADVANCE CARE PLANNING)
Pts now npo r/t requiring 40% bipap.       Wounds to bilateral buttocks, coccyx and bilateral heels.      Noted wt in EPIC notes 3/2022 135# and 1/5/23 100#. Pt is 66in w/ #  1/10 93# and CBW 92.2#     Pt met criteria for severe, chronic malnutrition 1/5- see RD note.        TF via NG tube: Diabetisource to goal rate 60ml/hr with 25ml Q2hr (+300ml) to provide 1728cal, 86g pro and 1180ml (+300 ml flushes) totals 1480ml fluids.     Na 151H, Glu 271, alb 3.0L    Discussed in IDT- Medical staff to approach family re: POC, possible comfort measures.       RD to follow

## 2023-01-13 NOTE — ACP (ADVANCE CARE PLANNING)
[facility] @ [facility]  INPATIENT PROGRESS NOTE    PATIENT NAME: Jonathan Williamson      PHYSICIAN: Josue Howe MD  : 1955        MRN: [id number]  Patient Care Team:  Provider, No Known as PCP - General    Chief Complaint: Patient was admitted after arrest and return of spontaneous circulation at [facility].  History of Present Illness: 67-year-old gentleman with history of COPD medic pneumothorax who had been admitted to hospital hospital after patient was found to have chest pain and had cardiac arrest patient had received cardiopulmonary resuscitation as per ACLS protocol with epinephrine which resulted in return of spontaneous circulation.  Patient was intubated and was on sedation.  Patient's ABG was reported 7.08/109 32.  Patient was also reported to be hypotensive and norepinephrine drip along with vasopressin drip.  Patient was covered with broad-spectrum IV antibiotics.  Patient was admitted to [facility] for further treatment.  Patient's condition gradually improved.  Patient was managed to be extubated however patient remains extremely weak and was not able to tolerate oral diet.  Patient was continued on antibiotics.  Due to patient's extreme critical illness along with weakness and deconditioning with requirement of Dobbhoff feedings patient was recommended admitted to our facility the discharge summary dictated on  had been reviewed as documented above.  At the time of evaluation patient was resting comfortably.  Patient denies any complaints.  No headache or blurry vision reported.  No chest pain palpitation reported.  Patient states he is having some chest tightness however he attributes to the CPR.  Patient also complains of feeling weak.  No nausea vomiting reported.  Patient's echocardiogram done on  suggestive of diastolic congestive heart failure.  Patient's EKG done on December suggestive of normal  sinus rhythm at 78 bpm with no acute ST changes noted.  No chamber enlargement has been identified.  Patient CT head was negative for any acute findings as well.  That was done on December 27, 2022.  Laboratory results prior to coming to her facility where from January 4, 2023 white count 12.9 hemoglobin 11.1 hematocrit 34.3 platelet count of 338.  Sodium 134 potassium 4.9 chloride 94 bicarb 39 BUN of 18 creatinine 0.4 calcium 8.4.  Patient will be admitted to our facility for further therapy especially try to get patient off of oxygen supplementation along with aggressive therapy and try to advance diet as tolerated.       Assessment       Acute on chronic hypoxic respiratory failure.  S/p cardiac arrest.  Septic shock, resolved.  Critical illness myopathy.  Grade diastolic dysfunction with congestive heart failure.  Hypothermia.  Coronary artery disease.  Chronic obstructive pulmonary disease exacerbation.  Hypertension  Dyslipidemia.  Depression.  DVT and GI prophylaxis.  Cachexia with BMI less than and severe protein calorie malnutrition.     DVT Prophylaxis: SCDs and JAYJAY stockings.  GI Prophylaxis: We will start Protonix.  Code Status: Full code.     Plan      -Continue rocephin as ordered.  -Continue with IV steroids and nebulizer treatment as ordered.  -Continue oxygen supplementation, try to wean as tolerated.  -Bi-PAP therapy nightly and as needed during the day.  -Leukocytosis at 24.22.  No fever or chills  -Remains inconsistent with Bi-PAP.  Gives verbal understanding of risks.  -Continue to strongly encourage compliance.  -Patient family understands patient prognosis and diagnosis.  -Continue therapy and strongly recommend out of bed daily as tolerated.  -Continue to monitor labs closely.  -Continue tube feeding as patient tolerates.  -Discussed comfort care option with patient and family, declines comfort care at this time.  -DVT and GI prophylaxis in place.  -We'll continue monitoring patient in  hospital setting and treat patient as course dictates.  -Please review orders for detailed plan of care.  -For Aute and Chronic medical condition we will continue medications described below in medication section which have been reviewed and we will continue unless changed in plan of care.  -Laboratory and diagnostic studies have been independently reviewed and the reports are reviewed as documented below.     Subjective   Interval History:   Patient Complaints:   Patient examined. Resting in bed with bi-pap on. No overnight events reported. No complaints or needs at this time.  History taken from: Chart, nursing staff.    Review of Systems:    Review of Systems   Constitutional: Positive for activity change and appetite change. Negative for chills, diaphoresis and fever.   HENT: Negative for congestion, rhinorrhea, sore throat and trouble swallowing.    Eyes: Negative for visual disturbance.   Respiratory: Negative for cough, chest tightness, shortness of breath and wheezing.    Cardiovascular: Negative for chest pain, palpitations and leg swelling.   Gastrointestinal: Negative for abdominal pain, blood in stool, diarrhea, nausea and vomiting.   Endocrine: Negative for cold intolerance and heat intolerance.   Genitourinary: Negative for decreased urine volume and difficulty urinating.   Musculoskeletal: Negative for back pain, gait problem and neck pain.   Skin: Negative for rash.   Neurological: Positive for weakness. Negative for dizziness, syncope, light-headedness, numbness and headaches.   Psychiatric/Behavioral: The patient is not nervous/anxious.        Objective   Vital Signs  Temp: 97.0F         Blood Pressure: 120/63 Heart Rate: 78        Resp: 20         O2: 94 %    Physical Exam:   Physical Exam  Vitals and nursing note reviewed.   Constitutional:       Appearance: He is well-developed.   HENT:      Head: Normocephalic and atraumatic.      Nose: Nose normal.   Eyes:      Extraocular Movements:  Extraocular movements intact.      Conjunctiva/sclera: Conjunctivae normal.      Pupils: Pupils are equal, round, and reactive to light.   Neck:      Thyroid: No thyromegaly.      Vascular: No JVD.      Trachea: No tracheal deviation.   Cardiovascular:      Rate and Rhythm: Regular rhythm. Tachycardia present.      Heart sounds: Normal heart sounds.   Pulmonary:      Effort: Pulmonary effort is normal. No respiratory distress.      Breath sounds: Wheezing and rhonchi present. No rales.   Chest:      Chest wall: No tenderness.   Abdominal:      General: Bowel sounds are normal. There is no distension.      Palpations: Abdomen is soft.      Tenderness: There is no abdominal tenderness. There is no guarding or rebound.   Musculoskeletal:         General: Normal range of motion.      Cervical back: Normal range of motion and neck supple.   Lymphadenopathy:      Cervical: No cervical adenopathy.   Skin:     General: Skin is warm and dry.      Capillary Refill: Capillary refill takes 2 to 3 seconds.      Comments: Intact   Neurological:      Mental Status: He is alert and oriented to person, place, and time.      Cranial Nerves: No cranial nerve deficit.      Motor: Weakness present.      Gait: Gait abnormal.      Deep Tendon Reflexes: Reflexes are normal and symmetric.   Psychiatric:         Mood and Affect: Mood normal.         Behavior: Behavior normal.       Results Review:       Results from last 7 days   Lab Units 01/13/23  0614 01/12/23  1451 01/12/23  0631 01/09/23  0608   SODIUM mmol/L 149* 151* 151* 144   POTASSIUM mmol/L 4.4 4.1 4.2 3.9   CHLORIDE mmol/L 99 102 101 93*   CO2 mmol/L 50.0* 49.0* 50.0* 45.0*   BUN mg/dL 42* 46* 49* 62*   CREATININE mg/dL 0.43* 0.52* 0.52* 0.58*   GLUCOSE mg/dL 248* 240* 274* 85   CALCIUM mg/dL 8.3* 8.2* 8.3* 8.8   BILIRUBIN mg/dL  --   --  0.3 0.6   ALK PHOS U/L  --   --  120* 88   ALT (SGPT) U/L  --   --  37 56*   AST (SGOT) U/L  --   --  24 21     Results from last 7 days   Lab  Units 01/12/23  0631 01/09/23  0608   MAGNESIUM mg/dL 2.5* 2.5*     Results from last 7 days   Lab Units 01/12/23  0631 01/09/23  0608   WBC 10*3/mm3 11.83* 24.22*   HEMOGLOBIN g/dL 10.4* 12.1*   HEMATOCRIT % 35.2* 38.3   PLATELETS 10*3/mm3 302 385     Lab Results   Component Value Date    TROPONINI 0.14 (H) 12/28/2022     pH, Arterial   Date Value Ref Range Status   01/13/2023 7.379 7.350 - 7.450 pH units Final     CO2   Date Value Ref Range Status   01/13/2023 50.0 (H) 22.0 - 29.0 mmol/L Final         Imaging Results (Most Recent)     Procedure Component Value Units Date/Time    XR Chest 1 View [[id number]] Collected: 01/09/23 1044     Updated: 01/09/23 3659    Narrative:          COMPARISON:     1/5/2023    INDICATION:     Shortness of air    FINDINGS:     Portable AP chest radiograph is obtained.  There is  a right upper extremity PICC line, with its tip last seen at the  level of the distal superior vena cava. There is a nasoenteric  catheter which extends below the inferior aspect of the image.   The cardiomediastinal silhouette appears normal size and  configuration.  The pulmonary vasculature is within normal  limits.  Lungs are hyperlucent. There is unchanged right upper  lobe parenchymal opacity with distortion. There are increased  right mid lung and basilar airspace opacities. Minimal blunting  right costophrenic angle. Minimal blunting left costophrenic  angle. No pneumothorax.      Impression:      1. Interval placement of a right upper extremity PICC line. Its  tip is not well visualized as it is obscured by overlying tubes,  but appears to be at the distal superior vena cava.  2. Worsening right mid lung and basilar airspace opacities.  Possible small left effusion.      3. Right upper lobe parenchymal consolidation with distortion.    Electronically signed by:  Ludwig Aguilera MD  1/9/2023 5:26 PM [facility]  Workstation: [id number]    XR Chest 1 View [[id number]] Collected: 01/05/23 1447     Updated: 01/05/23 4637     Narrative:      EXAM: XR CHEST 1 VIEW    HISTORY: Increased SOA, Z74.09 Other reduced mobility Z78.9 Other  specified health status    COMPARISON:    TECHNIQUE:   Portable view of the chest.    FINDINGS:   Bullous change in the right upper lung with coexisting streaky  change which may represent parenchymal scarring. There is feeding  tube in place with distal portion below the diaphragm..  Bilateral hyperinflation of the lung parenchyma due to COPD.  Probable chronic pleural parenchymal change in the left lateral  costophrenic angle.  The left lung remains well aerated. The heart size is within  normal limits. The mediastinal contours are within normal limits.  .  No evidence of mediastinal shift or pneumothorax.  Unremarkable visualized osseous structures.      Impression:      1.  COPD.  2.  Bullous change in the right upper lung with coexisting  streaky change which may represent parenchymal scarring.  3.  There is feeding tube in place with distal portion below the  diaphragm..          Electronically signed by:  Carl Mosley MD  1/5/2023 3:48 PM CST  Workstation: [id number]    XR Abdomen KUB [[id number]] Collected: 01/04/23 2322     Updated: 01/04/23 2351    Narrative:      PROCEDURE:    XR ABDOMEN 1 VIEW (KUB)  dated  1/4/2023 11:22 PM  CST    CLINICAL HISTORY:   Male 67 years of age.   New Admission Verify  NG Tube Feeding Placement    TECHNIQUE: Frontal view supine abdomen.    PREVIOUS STUDIES:  None Available    FINDINGS:      Feeding tube within the descending duodenum.  No intraperitoneal free air. Intraluminal gas throughout  nondilated small intestine and colon.  Limited assessment of the origin soft tissue planes.      Impression:      Feeding tube within the descending duodenum.    Electronically signed by:  Nasreen Camacho MD  1/4/2023 11:49 PM  CST Workstation: [id number]         No results found for: ACANTHNAEG, AFBCX, BPERTUSSISCX, BLOODCX  No results found for: BCIDPCR, CXREFLEX, CSFCX,  CULTURETIS  No results found for: CULTURES, HSVCX, URCX  No results found for: EYECULTURE, GCCX, HSVCULTURE, LABHSV  No results found for: LEGIONELLA, MRSACX, MUMPSCX, MYCOPLASCX  No results found for: NOCARDIACX, STOOLCX  No results found for: THROATCX, UNSTIMCULT, URINECX, CULTURE, VZVCULTUR  No results found for: VIRALCULTU, WOUNDCX  Medication Reviewed and Will Continue Unless Documented in Plan:   atorvastatin, 40 mg, Oral, Nightly  budesonide, 0.5 mg, Nebulization, BID - RT  carvedilol, 6.25 mg, Oral, BID With Meals  cefTRIAXone, 1 g, Intravenous, Q24H  clopidogrel, 75 mg, Oral, Daily  escitalopram, 30 mg, Oral, Daily  furosemide, 60 mg, Intravenous, Once  heparin (porcine), 5,000 Units, Subcutaneous, Q8H  Insulin Aspart, 0-12 Units, Subcutaneous, 4x Daily AC & at Bedtime  isosorbide dinitrate, 15 mg, Oral, TID - Nitrates  methylPREDNISolone sodium succinate, 40 mg, Intravenous, Q12H  metroNIDAZOLE, 500 mg, Intravenous, Q8H  potassium chloride, 40 mEq, Per PEG Tube, Once  sodium chloride, 3 mL, Intracatheter, Q12H  sodium zirconium cyclosilicate, 10 g, Oral, Daily  traZODone, 25 mg, Per PEG Tube, Nightly         •  acetaminophen **OR** acetaminophen **OR** acetaminophen  •  albuterol  •  ALPRAZolam  •  dextrose  •  guaifenesin  •  ipratropium-albuterol  •  ondansetron **OR** ondansetron  •  sodium chloride  •  sodium chloride      Dietary Orders (From admission, onward)     Start     Ordered    01/12/23 1600  NPO Diet NPO Type: Tube Feeding  Diet Effective Now        Question:  NPO Type  Answer:  Tube Feeding    01/12/23 1600    01/12/23 1423  Diet, Tube Feeding Linace AC (Glucerna 1.2); Tube Feeding Type: Continuous; Continuous Tube Feeding Start Rate (mL/hr): 30; Then Advance Rate By (mL/hr): 10; Every __ Hours: 8; To Goal Rate of (mL/hr): 60  Diet Effective Now        Question Answer Comment   Tube Feeding Formula: Diabetisource AC (Glucerna 1.2)    Tube Feeding Type Continuous    Continuous Tube  Feeding Start Rate (mL/hr) 30    Then Advance Rate By (mL/hr) 10    Every __ Hours 8    To Goal Rate of (mL/hr) 60    Water Flush Amount (mL) 50    Water Flush Frequency Every 1 Hour        01/12/23 1424              History, physical exam, assessment and plan may have been partly or fully copied from before, but  changes made to the copied record to reflect care on the date of service. Part of the lab and imaging  reports auto populated and corrected. Some of this note may be an electronic transcription of spoken  language to printed text. This may permit erroneous, or at times, nonsensical words or phrases to be  inadvertently transcribed. Although I have reviewed the note for such errors, some may still exist.      This document has been electronically signed by Josue Howe MD on January 13, 2023 10:15 CST

## 2023-01-13 NOTE — ACP (ADVANCE CARE PLANNING)
"  [facility]  [address] [place] [address]. [address]  T - [phone]  F - [phone]     Progress Note          PATIENT  DEMOGRAPHICS   PATIENT NAME: Jonathan Williamson                      PHYSICIAN: Timothy Turner MD  : 1955  MRN: [id number]   LOS: 0 days    Patient Care Team:  Provider, No Known as PCP - General  Subjective   SUBJECTIVE   Remained on bipap. Opening eyes and responding to questions         Objective   OBJECTIVE   Vital Signs  Heart Rate:  [71-82] 71  T 96.9  HR 71   RR 25 /70  Flowsheet Rows    Flowsheet Row First Filed Value   Admission Height 165 cm (64.96\") Documented at 2023   Admission Weight 45.5 kg (100 lb 5 oz) Documented at 2023           No intake/output data recorded.    PHYSICAL EXAM    Physical Exam  Constitutional:       General: He is in acute distress.      Appearance: He is well-developed. He is ill-appearing.   HENT:      Head: Normocephalic.   Eyes:      Pupils: Pupils are equal, round, and reactive to light.   Cardiovascular:      Rate and Rhythm: Normal rate and regular rhythm.      Heart sounds: Normal heart sounds.   Pulmonary:      Effort: Respiratory distress present.      Breath sounds: Normal breath sounds.   Abdominal:      General: Bowel sounds are normal.      Palpations: Abdomen is soft.   Musculoskeletal:         General: No swelling.   Skin:     Coloration: Skin is not jaundiced.   Neurological:      Mental Status: He is alert and oriented to person, place, and time.      Sensory: No sensory deficit.           RESULTS   Results Review:    Results from last 7 days   Lab Units 23  0614 23  1451 23  0631 23  0608   SODIUM mmol/L 149* 151* 151* 144   POTASSIUM mmol/L 4.4 4.1 4.2 3.9   CHLORIDE mmol/L 99 102 101 93*   CO2 mmol/L 50.0* 49.0* 50.0* 45.0*   BUN mg/dL 42* 46* 49* 62*   CREATININE mg/dL 0.43* 0.52* 0.52* 0.58*   CALCIUM mg/dL 8.3* 8.2* 8.3* 8.8   BILIRUBIN mg/dL  --   --  0.3 0.6 "   ALK PHOS U/L  --   --  120* 88   ALT (SGPT) U/L  --   --  37 56*   AST (SGOT) U/L  --   --  24 21   GLUCOSE mg/dL 248* 240* 274* 85       Estimated Creatinine Clearance: 108.8 mL/min (A) (by C-G formula based on SCr of 0.43 mg/dL (L)).    Results from last 7 days   Lab Units 01/12/23  0631 01/09/23  0608   MAGNESIUM mg/dL 2.5* 2.5*             Results from last 7 days   Lab Units 01/12/23  0631 01/09/23  0608   WBC 10*3/mm3 11.83* 24.22*   HEMOGLOBIN g/dL 10.4* 12.1*   PLATELETS 10*3/mm3 302 385               Imaging Results (Last 24 Hours)     ** No results found for the last 24 hours. **           MEDICATIONS    atorvastatin, 40 mg, Oral, Nightly  budesonide, 0.5 mg, Nebulization, BID - RT  carvedilol, 6.25 mg, Oral, BID With Meals  cefTRIAXone, 1 g, Intravenous, Q24H  clopidogrel, 75 mg, Oral, Daily  escitalopram, 30 mg, Oral, Daily  furosemide, 60 mg, Intravenous, Once  heparin (porcine), 5,000 Units, Subcutaneous, Q8H  Insulin Aspart, 0-12 Units, Subcutaneous, 4x Daily AC & at Bedtime  isosorbide dinitrate, 15 mg, Oral, TID - Nitrates  methylPREDNISolone sodium succinate, 40 mg, Intravenous, Q12H  metroNIDAZOLE, 500 mg, Intravenous, Q8H  potassium chloride, 40 mEq, Per PEG Tube, Once  sodium chloride, 3 mL, Intracatheter, Q12H  sodium zirconium cyclosilicate, 10 g, Oral, Daily  traZODone, 25 mg, Per PEG Tube, Nightly           Assessment & Plan   ASSESSMENT / PLAN      * No active hospital problems. *    1.  Hypernatremia- sodium up to 151, appears dry on exam.  na some better. Increase free water with tube feeds to 75 mils per hour.  Daily BMP for 3 days.  BUN is also elevated suggesting prerenal azotemia. Limit diuretic use due to high na     2.  Acute on chronic hypoxic respiratory failure- continue noninvasive ventilatory support, manage per primary team. hypercarbiac respiratory failure with compensatory metabolic alkalosis. Ph is 7.37     3.  COPD exacerbation     4.  S/p cardiac arrest     5.   CAD     6.  Diastolic dysfunction/CHF     7.  Cachexia/malnutrition- continue tube feeds per primary team                This document has been electronically signed by Timothy Turner MD on January 13, 2023 15:38 CST

## 2023-01-14 NOTE — ACP (ADVANCE CARE PLANNING)
[facility] @ [facility]  INPATIENT PROGRESS NOTE    PATIENT NAME: Jonathan Williamson      PHYSICIAN: Josue Howe MD  : 1955        MRN: [id number]  Patient Care Team:  Provider, No Known as PCP - General    Chief Complaint: Patient was admitted after arrest and return of spontaneous circulation at [facility].  History of Present Illness: 67-year-old gentleman with history of COPD medic pneumothorax who had been admitted to hospital hospital after patient was found to have chest pain and had cardiac arrest patient had received cardiopulmonary resuscitation as per ACLS protocol with epinephrine which resulted in return of spontaneous circulation.  Patient was intubated and was on sedation.  Patient's ABG was reported 7.08/109 32.  Patient was also reported to be hypotensive and norepinephrine drip along with vasopressin drip.  Patient was covered with broad-spectrum IV antibiotics.  Patient was admitted to [facility] for further treatment.  Patient's condition gradually improved.  Patient was managed to be extubated however patient remains extremely weak and was not able to tolerate oral diet.  Patient was continued on antibiotics.  Due to patient's extreme critical illness along with weakness and deconditioning with requirement of Dobbhoff feedings patient was recommended admitted to our facility the discharge summary dictated on  had been reviewed as documented above.  At the time of evaluation patient was resting comfortably.  Patient denies any complaints.  No headache or blurry vision reported.  No chest pain palpitation reported.  Patient states he is having some chest tightness however he attributes to the CPR.  Patient also complains of feeling weak.  No nausea vomiting reported.  Patient's echocardiogram done on  suggestive of diastolic congestive heart failure.  Patient's EKG done on December suggestive of normal  sinus rhythm at 78 bpm with no acute ST changes noted.  No chamber enlargement has been identified.  Patient CT head was negative for any acute findings as well.  That was done on December 27, 2022.  Laboratory results prior to coming to her facility where from January 4, 2023 white count 12.9 hemoglobin 11.1 hematocrit 34.3 platelet count of 338.  Sodium 134 potassium 4.9 chloride 94 bicarb 39 BUN of 18 creatinine 0.4 calcium 8.4.  Patient will be admitted to our facility for further therapy especially try to get patient off of oxygen supplementation along with aggressive therapy and try to advance diet as tolerated.       Assessment       Acute on chronic hypoxic respiratory failure.  S/p cardiac arrest.  Septic shock, resolved.  Critical illness myopathy.  Grade diastolic dysfunction with congestive heart failure.  Hypothermia.  Coronary artery disease.  Chronic obstructive pulmonary disease exacerbation.  Hypertension  Dyslipidemia.  Depression.  DVT and GI prophylaxis.  Cachexia with BMI less than and severe protein calorie malnutrition.     DVT Prophylaxis: SCDs and JAYJAY stockings.  GI Prophylaxis: We will start Protonix.  Code Status: Full code.     Plan      -Continue rocephin as ordered.  -Continue with IV steroids and nebulizer treatment as before.  -Continue oxygen supplementation.  -Bi-PAP therapy nightly and as needed during the day.  -Leukocytosis at 24.22.  No fever or chills  -Patient family understands patient prognosis and diagnosis.  -Continue therapy as patient tolerates and strongly recommend out of bed daily as tolerated.  -Continue to monitor labs closely.  -Continue tube feeding as patient tolerates.  -Discussed comfort care option with patient and family, declines comfort care at this time.  -DVT and GI prophylaxis in place.  -We'll continue monitoring patient in hospital setting and treat patient as course dictates.  -Please review orders for detailed plan of care.  -For Aute and Chronic  medical condition we will continue medications described below in medication section which have been reviewed and we will continue unless changed in plan of care.  -Laboratory and diagnostic studies have been independently reviewed and the reports are reviewed as documented below.     Subjective   Interval History:   Patient Complaints:   Patient seen and examined. Resting in bed. Patient wearing bi-pap. No overnight events noted. No needs at this time.  History taken from: Chart, nursing staff.    Review of Systems:    Review of Systems   Constitutional: Positive for activity change and appetite change. Negative for chills, diaphoresis and fever.   HENT: Negative for congestion, rhinorrhea, sore throat and trouble swallowing.    Eyes: Negative for visual disturbance.   Respiratory: Negative for cough, chest tightness, shortness of breath and wheezing.    Cardiovascular: Negative for chest pain, palpitations and leg swelling.   Gastrointestinal: Negative for abdominal pain, blood in stool, diarrhea, nausea and vomiting.   Endocrine: Negative for cold intolerance and heat intolerance.   Genitourinary: Negative for decreased urine volume and difficulty urinating.   Musculoskeletal: Negative for back pain, gait problem and neck pain.   Skin: Negative for rash.   Neurological: Positive for weakness. Negative for dizziness, syncope, light-headedness, numbness and headaches.   Psychiatric/Behavioral: The patient is not nervous/anxious.        Objective   Vital Signs  Temp: 97.5F         Heart Rate: 72         Resp: 22        Blood Pressure: 143/64       Pulse Ox: 100 %    Physical Exam:   Physical Exam  Vitals and nursing note reviewed.   Constitutional:       Appearance: He is well-developed.   HENT:      Head: Normocephalic and atraumatic.      Nose: Nose normal.   Eyes:      Extraocular Movements: Extraocular movements intact.      Conjunctiva/sclera: Conjunctivae normal.      Pupils: Pupils are equal, round, and  reactive to light.   Neck:      Thyroid: No thyromegaly.      Vascular: No JVD.      Trachea: No tracheal deviation.   Cardiovascular:      Rate and Rhythm: Regular rhythm. Tachycardia present.      Heart sounds: Normal heart sounds.   Pulmonary:      Effort: Pulmonary effort is normal. No respiratory distress.      Breath sounds: Wheezing and rhonchi present. No rales.   Chest:      Chest wall: No tenderness.   Abdominal:      General: Bowel sounds are normal. There is no distension.      Palpations: Abdomen is soft.      Tenderness: There is no abdominal tenderness. There is no guarding or rebound.   Musculoskeletal:         General: Normal range of motion.      Cervical back: Normal range of motion and neck supple.   Lymphadenopathy:      Cervical: No cervical adenopathy.   Skin:     General: Skin is warm and dry.      Capillary Refill: Capillary refill takes 2 to 3 seconds.      Comments: Intact   Neurological:      Mental Status: He is alert and oriented to person, place, and time.      Cranial Nerves: No cranial nerve deficit.      Motor: Weakness present.      Gait: Gait abnormal.      Deep Tendon Reflexes: Reflexes are normal and symmetric.   Psychiatric:         Mood and Affect: Mood normal.         Behavior: Behavior normal.       Results Review:       Results from last 7 days   Lab Units 01/14/23  0619 01/13/23  2330 01/13/23  0614 01/12/23  1451 01/12/23  0631 01/09/23  0608   SODIUM mmol/L 145 147* 149* 151* 151* 144   POTASSIUM mmol/L 4.4 4.4 4.4 4.1 4.2 3.9   CHLORIDE mmol/L 94* 97* 99 102 101 93*   CO2 mmol/L >50.0* >50.0* 50.0* 49.0* 50.0* 45.0*   BUN mg/dL 45* 46* 42* 46* 49* 62*   CREATININE mg/dL 0.50* 0.47* 0.43* 0.52* 0.52* 0.58*   GLUCOSE mg/dL 226* 169* 248* 240* 274* 85   CALCIUM mg/dL 8.3* 8.2* 8.3* 8.2* 8.3* 8.8   BILIRUBIN mg/dL  --   --   --   --  0.3 0.6   ALK PHOS U/L  --   --   --   --  120* 88   ALT (SGPT) U/L  --   --   --   --  37 56*   AST (SGOT) U/L  --   --   --   --  24 21      Results from last 7 days   Lab Units 01/12/23  0631 01/09/23  0608   MAGNESIUM mg/dL 2.5* 2.5*     Results from last 7 days   Lab Units 01/12/23  0631 01/09/23  0608   WBC 10*3/mm3 11.83* 24.22*   HEMOGLOBIN g/dL 10.4* 12.1*   HEMATOCRIT % 35.2* 38.3   PLATELETS 10*3/mm3 302 385     Lab Results   Component Value Date    TROPONINI 0.14 (H) 12/28/2022     pH, Arterial   Date Value Ref Range Status   01/13/2023 7.379 7.350 - 7.450 pH units Final     CO2   Date Value Ref Range Status   01/14/2023 >50.0 (H) 22.0 - 29.0 mmol/L Final         Imaging Results (Most Recent)     Procedure Component Value Units Date/Time    XR Chest 1 View [[id number]] Collected: 01/09/23 1044     Updated: 01/09/23 1729    Narrative:          COMPARISON:     1/5/2023    INDICATION:     Shortness of air    FINDINGS:     Portable AP chest radiograph is obtained.  There is  a right upper extremity PICC line, with its tip last seen at the  level of the distal superior vena cava. There is a nasoenteric  catheter which extends below the inferior aspect of the image.   The cardiomediastinal silhouette appears normal size and  configuration.  The pulmonary vasculature is within normal  limits.  Lungs are hyperlucent. There is unchanged right upper  lobe parenchymal opacity with distortion. There are increased  right mid lung and basilar airspace opacities. Minimal blunting  right costophrenic angle. Minimal blunting left costophrenic  angle. No pneumothorax.      Impression:      1. Interval placement of a right upper extremity PICC line. Its  tip is not well visualized as it is obscured by overlying tubes,  but appears to be at the distal superior vena cava.  2. Worsening right mid lung and basilar airspace opacities.  Possible small left effusion.      3. Right upper lobe parenchymal consolidation with distortion.    Electronically signed by:  Ludwig Aguilera MD  1/9/2023 5:26 PM CST  Workstation: [id number]    XR Chest 1 View [[id number]] Collected:  01/05/23 1447     Updated: 01/05/23 1551    Narrative:      EXAM: XR CHEST 1 VIEW    HISTORY: Increased SOA, Z74.09 Other reduced mobility Z78.9 Other  specified health status    COMPARISON:    TECHNIQUE:   Portable view of the chest.    FINDINGS:   Bullous change in the right upper lung with coexisting streaky  change which may represent parenchymal scarring. There is feeding  tube in place with distal portion below the diaphragm..  Bilateral hyperinflation of the lung parenchyma due to COPD.  Probable chronic pleural parenchymal change in the left lateral  costophrenic angle.  The left lung remains well aerated. The heart size is within  normal limits. The mediastinal contours are within normal limits.  .  No evidence of mediastinal shift or pneumothorax.  Unremarkable visualized osseous structures.      Impression:      1.  COPD.  2.  Bullous change in the right upper lung with coexisting  streaky change which may represent parenchymal scarring.  3.  There is feeding tube in place with distal portion below the  diaphragm..          Electronically signed by:  Carl Mosley MD  1/5/2023 3:48 PM CST  Workstation: [id number]    XR Abdomen KUB [[id number]] Collected: 01/04/23 2322     Updated: 01/04/23 2351    Narrative:      PROCEDURE:    XR ABDOMEN 1 VIEW (KUB)  dated  1/4/2023 11:22 PM  CST    CLINICAL HISTORY:   Male 67 years of age.   New Admission Verify  NG Tube Feeding Placement    TECHNIQUE: Frontal view supine abdomen.    PREVIOUS STUDIES:  None Available    FINDINGS:      Feeding tube within the descending duodenum.  No intraperitoneal free air. Intraluminal gas throughout  nondilated small intestine and colon.  Limited assessment of the origin soft tissue planes.      Impression:      Feeding tube within the descending duodenum.    Electronically signed by:  Nasreen Camacho MD  1/4/2023 11:49 PM  CST Workstation: [id number]         No results found for: ACANTHNAEG, AFBCX, BPERTUSSISCX, BLOODCX  No results found  for: BCIDPCR, CXREFLEX, CSFCX, CULTURETIS  No results found for: CULTURES, HSVCX, URCX  No results found for: EYECULTURE, GCCX, HSVCULTURE, LABHSV  No results found for: LEGIONELLA, MRSACX, MUMPSCX, MYCOPLASCX  No results found for: NOCARDIACX, STOOLCX  No results found for: THROATCX, UNSTIMCULT, URINECX, CULTURE, VZVCULTUR  No results found for: VIRALCULTU, WOUNDCX  Medication Reviewed and Will Continue Unless Documented in Plan:   atorvastatin, 40 mg, Oral, Nightly  budesonide, 0.5 mg, Nebulization, BID - RT  carvedilol, 6.25 mg, Oral, BID With Meals  cefTRIAXone, 1 g, Intravenous, Q24H  clopidogrel, 75 mg, Oral, Daily  escitalopram, 30 mg, Oral, Daily  furosemide, 60 mg, Intravenous, Once  heparin (porcine), 5,000 Units, Subcutaneous, Q8H  Insulin Aspart, 0-12 Units, Subcutaneous, 4x Daily AC & at Bedtime  isosorbide dinitrate, 15 mg, Oral, TID - Nitrates  methylPREDNISolone sodium succinate, 40 mg, Intravenous, Q12H  metroNIDAZOLE, 500 mg, Intravenous, Q8H  potassium chloride, 40 mEq, Per PEG Tube, Once  sodium chloride, 3 mL, Intracatheter, Q12H  traZODone, 25 mg, Per PEG Tube, Nightly         •  acetaminophen **OR** acetaminophen **OR** acetaminophen  •  albuterol  •  ALPRAZolam  •  dextrose  •  guaifenesin  •  ipratropium-albuterol  •  ondansetron **OR** ondansetron  •  sodium chloride  •  sodium chloride      Dietary Orders (From admission, onward)     Start     Ordered    01/13/23 1542  Diet, Tube Feeding Diabetisource AC (Glucerna 1.2); Tube Feeding Type: Continuous; Continuous Tube Feeding Start Rate (mL/hr): 30; Then Advance Rate By (mL/hr): 10; Every __ Hours: 8; To Goal Rate of (mL/hr): 60  Diet Effective Now        Question Answer Comment   Tube Feeding Formula: Diabetisource AC (Glucerna 1.2)    Tube Feeding Type Continuous    Continuous Tube Feeding Start Rate (mL/hr) 30    Then Advance Rate By (mL/hr) 10    Every __ Hours 8    To Goal Rate of (mL/hr) 60    Water Flush Amount (mL) 70    Water Flush  Frequency Every 1 Hour        01/13/23 1548    01/12/23 1600  NPO Diet NPO Type: Tube Feeding  Diet Effective Now        Question:  NPO Type  Answer:  Tube Feeding    01/12/23 1600              History, physical exam, assessment and plan may have been partly or fully copied from before, but  changes made to the copied record to reflect care on the date of service. Part of the lab and imaging  reports auto populated and corrected. Some of this note may be an electronic transcription of spoken  language to printed text. This may permit erroneous, or at times, nonsensical words or phrases to be  inadvertently transcribed. Although I have reviewed the note for such errors, some may still exist.      This document has been electronically signed by Josue Howe MD on January 14, 2023 10:47 CST

## 2023-01-15 NOTE — ACP (ADVANCE CARE PLANNING)
[facility] @ [facility]  INPATIENT PROGRESS NOTE    PATIENT NAME: Jonathan Williamson      PHYSICIAN: Josue Howe MD  : 1955        MRN: [id number]  Patient Care Team:  Provider, No Known as PCP - General    Chief Complaint: Patient was admitted after arrest and return of spontaneous circulation at [facility].  History of Present Illness: 67-year-old gentleman with history of COPD medic pneumothorax who had been admitted to hospital hospital after patient was found to have chest pain and had cardiac arrest patient had received cardiopulmonary resuscitation as per ACLS protocol with epinephrine which resulted in return of spontaneous circulation.  Patient was intubated and was on sedation.  Patient's ABG was reported 7.08/109 32.  Patient was also reported to be hypotensive and norepinephrine drip along with vasopressin drip.  Patient was covered with broad-spectrum IV antibiotics.  Patient was admitted to [facility] for further treatment.  Patient's condition gradually improved.  Patient was managed to be extubated however patient remains extremely weak and was not able to tolerate oral diet.  Patient was continued on antibiotics.  Due to patient's extreme critical illness along with weakness and deconditioning with requirement of Dobbhoff feedings patient was recommended admitted to our facility the discharge summary dictated on  had been reviewed as documented above.  At the time of evaluation patient was resting comfortably.  Patient denies any complaints.  No headache or blurry vision reported.  No chest pain palpitation reported.  Patient states he is having some chest tightness however he attributes to the CPR.  Patient also complains of feeling weak.  No nausea vomiting reported.  Patient's echocardiogram done on  suggestive of diastolic congestive heart failure.  Patient's EKG done on December suggestive of normal  sinus rhythm at 78 bpm with no acute ST changes noted.  No chamber enlargement has been identified.  Patient CT head was negative for any acute findings as well.  That was done on December 27, 2022.  Laboratory results prior to coming to her facility where from January 4, 2023 white count 12.9 hemoglobin 11.1 hematocrit 34.3 platelet count of 338.  Sodium 134 potassium 4.9 chloride 94 bicarb 39 BUN of 18 creatinine 0.4 calcium 8.4.  Patient will be admitted to our facility for further therapy especially try to get patient off of oxygen supplementation along with aggressive therapy and try to advance diet as tolerated.       Assessment       Acute on chronic hypoxic respiratory failure.  S/p cardiac arrest.  Septic shock, resolved.  Critical illness myopathy.  Grade diastolic dysfunction with congestive heart failure.  Hypothermia.  Coronary artery disease.  Chronic obstructive pulmonary disease exacerbation.  Hypertension  Dyslipidemia.  Depression.  DVT and GI prophylaxis.  Cachexia with BMI less than and severe protein calorie malnutrition.     DVT Prophylaxis: SCDs and JAYJAY stockings.  GI Prophylaxis: We will start Protonix.  Code Status: Full code.     Plan      -Remains deconditioned.   -Continue with IV steroids and nebulizer treatment as before.  -Diuretics as before.  -Continue oxygen supplementation.  -Bi-PAP therapy nightly and as needed during the day.  -Patient family understands patient prognosis and diagnosis.  -Continue therapy as patient tolerates and strongly recommend out of bed daily as tolerated.  -Continue to monitor labs closely.  -Continue tube feeding as patient tolerates.  -Patient continues to request curative care.   -Pulmonology follow up appreciated.   -DVT and GI prophylaxis in place.  -We'll continue monitoring patient in hospital setting and treat patient as course dictates.  -Please review orders for detailed plan of care.  -For Aute and Chronic medical condition we will continue  medications described below in medication section which have been reviewed and we will continue unless changed in plan of care.  -Laboratory and diagnostic studies have been independently reviewed and the reports are reviewed as documented below.     Subjective   Interval History:   Patient Complaints:   Patient seen and examined. Resting in bed. On bi-pap.  Continues to only tolerate minutes off of bi-pap. No overnight events reported.   History taken from: Chart, nursing staff.    Review of Systems:    Review of Systems   Constitutional: Positive for activity change and appetite change. Negative for chills, diaphoresis and fever.   HENT: Negative for congestion, rhinorrhea, sore throat and trouble swallowing.    Eyes: Negative for visual disturbance.   Respiratory: Negative for cough, chest tightness, shortness of breath and wheezing.    Cardiovascular: Negative for chest pain, palpitations and leg swelling.   Gastrointestinal: Negative for abdominal pain, blood in stool, diarrhea, nausea and vomiting.   Endocrine: Negative for cold intolerance and heat intolerance.   Genitourinary: Negative for decreased urine volume and difficulty urinating.   Musculoskeletal: Negative for back pain, gait problem and neck pain.   Skin: Negative for rash.   Neurological: Positive for weakness. Negative for dizziness, syncope, light-headedness, numbness and headaches.   Psychiatric/Behavioral: The patient is not nervous/anxious.        Objective   Vital Signs  Temp: 98F         Heart Rate: 78         Resp: 23          Blood Pressure: 104/59       Pulse Ox: 92%    Physical Exam:   Physical Exam  Vitals and nursing note reviewed.   Constitutional:       Appearance: He is well-developed.   HENT:      Head: Normocephalic and atraumatic.      Nose: Nose normal.   Eyes:      Extraocular Movements: Extraocular movements intact.      Conjunctiva/sclera: Conjunctivae normal.      Pupils: Pupils are equal, round, and reactive to light.    Neck:      Thyroid: No thyromegaly.      Vascular: No JVD.      Trachea: No tracheal deviation.   Cardiovascular:      Rate and Rhythm: Regular rhythm. Tachycardia present.      Heart sounds: Normal heart sounds.   Pulmonary:      Effort: Pulmonary effort is normal. No respiratory distress.      Breath sounds: Wheezing and rhonchi present. No rales.   Chest:      Chest wall: No tenderness.   Abdominal:      General: Bowel sounds are normal. There is no distension.      Palpations: Abdomen is soft.      Tenderness: There is no abdominal tenderness. There is no guarding or rebound.   Musculoskeletal:         General: Normal range of motion.      Cervical back: Normal range of motion and neck supple.   Lymphadenopathy:      Cervical: No cervical adenopathy.   Skin:     General: Skin is warm and dry.      Capillary Refill: Capillary refill takes 2 to 3 seconds.      Comments: Intact   Neurological:      Mental Status: He is alert and oriented to person, place, and time.      Cranial Nerves: No cranial nerve deficit.      Motor: Weakness present.      Gait: Gait abnormal.      Deep Tendon Reflexes: Reflexes are normal and symmetric.   Psychiatric:         Mood and Affect: Mood normal.         Behavior: Behavior normal.       Results Review:       Results from last 7 days   Lab Units 01/14/23  0619 01/13/23  2330 01/13/23  0614 01/12/23  1451 01/12/23  0631 01/09/23  0608   SODIUM mmol/L 145 147* 149* 151* 151* 144   POTASSIUM mmol/L 4.4 4.4 4.4 4.1 4.2 3.9   CHLORIDE mmol/L 94* 97* 99 102 101 93*   CO2 mmol/L >50.0* >50.0* 50.0* 49.0* 50.0* 45.0*   BUN mg/dL 45* 46* 42* 46* 49* 62*   CREATININE mg/dL 0.50* 0.47* 0.43* 0.52* 0.52* 0.58*   GLUCOSE mg/dL 226* 169* 248* 240* 274* 85   CALCIUM mg/dL 8.3* 8.2* 8.3* 8.2* 8.3* 8.8   BILIRUBIN mg/dL  --   --   --   --  0.3 0.6   ALK PHOS U/L  --   --   --   --  120* 88   ALT (SGPT) U/L  --   --   --   --  37 56*   AST (SGOT) U/L  --   --   --   --  24 21     Results from last 7  days   Lab Units 01/12/23  0631 01/09/23  0608   MAGNESIUM mg/dL 2.5* 2.5*     Results from last 7 days   Lab Units 01/12/23  0631 01/09/23  0608   WBC 10*3/mm3 11.83* 24.22*   HEMOGLOBIN g/dL 10.4* 12.1*   HEMATOCRIT % 35.2* 38.3   PLATELETS 10*3/mm3 302 385     Lab Results   Component Value Date    TROPONINI 0.14 (H) 12/28/2022     pH, Arterial   Date Value Ref Range Status   01/13/2023 7.379 7.350 - 7.450 pH units Final     CO2   Date Value Ref Range Status   01/14/2023 >50.0 (H) 22.0 - 29.0 mmol/L Final         Imaging Results (Most Recent)     Procedure Component Value Units Date/Time    XR Chest 1 View [[id number]] Collected: 01/09/23 1044     Updated: 01/09/23 1729    Narrative:          COMPARISON:     1/5/2023    INDICATION:     Shortness of air    FINDINGS:     Portable AP chest radiograph is obtained.  There is  a right upper extremity PICC line, with its tip last seen at the  level of the distal superior vena cava. There is a nasoenteric  catheter which extends below the inferior aspect of the image.   The cardiomediastinal silhouette appears normal size and  configuration.  The pulmonary vasculature is within normal  limits.  Lungs are hyperlucent. There is unchanged right upper  lobe parenchymal opacity with distortion. There are increased  right mid lung and basilar airspace opacities. Minimal blunting  right costophrenic angle. Minimal blunting left costophrenic  angle. No pneumothorax.      Impression:      1. Interval placement of a right upper extremity PICC line. Its  tip is not well visualized as it is obscured by overlying tubes,  but appears to be at the distal superior vena cava.  2. Worsening right mid lung and basilar airspace opacities.  Possible small left effusion.      3. Right upper lobe parenchymal consolidation with distortion.    Electronically signed by:  Ludwig Aguilera MD  1/9/2023 5:26 PM CST  Workstation: [id number]    XR Chest 1 View [[id number]] Collected: 01/05/23 8023     Updated:  01/05/23 1551    Narrative:      EXAM: XR CHEST 1 VIEW    HISTORY: Increased SOA, Z74.09 Other reduced mobility Z78.9 Other  specified health status    COMPARISON:    TECHNIQUE:   Portable view of the chest.    FINDINGS:   Bullous change in the right upper lung with coexisting streaky  change which may represent parenchymal scarring. There is feeding  tube in place with distal portion below the diaphragm..  Bilateral hyperinflation of the lung parenchyma due to COPD.  Probable chronic pleural parenchymal change in the left lateral  costophrenic angle.  The left lung remains well aerated. The heart size is within  normal limits. The mediastinal contours are within normal limits.  .  No evidence of mediastinal shift or pneumothorax.  Unremarkable visualized osseous structures.      Impression:      1.  COPD.  2.  Bullous change in the right upper lung with coexisting  streaky change which may represent parenchymal scarring.  3.  There is feeding tube in place with distal portion below the  diaphragm..          Electronically signed by:  Carl Mosley MD  1/5/2023 3:48 PM CST  Workstation: [id number]    XR Abdomen KUB [[id number]] Collected: 01/04/23 2322     Updated: 01/04/23 2351    Narrative:      PROCEDURE:    XR ABDOMEN 1 VIEW (KUB)  dated  1/4/2023 11:22 PM  CST    CLINICAL HISTORY:   Male 67 years of age.   New Admission Verify  NG Tube Feeding Placement    TECHNIQUE: Frontal view supine abdomen.    PREVIOUS STUDIES:  None Available    FINDINGS:      Feeding tube within the descending duodenum.  No intraperitoneal free air. Intraluminal gas throughout  nondilated small intestine and colon.  Limited assessment of the origin soft tissue planes.      Impression:      Feeding tube within the descending duodenum.    Electronically signed by:  Nasreen Camacho MD  1/4/2023 11:49 PM  CST Workstation: [id number]         No results found for: ACANTHNAEG, AFBCX, BPERTUSSISCX, BLOODCX  No results found for: BCIDPCR, CXREFLEX,  CSFCX, CULTURETIS  No results found for: CULTURES, HSVCX, URCX  No results found for: EYECULTURE, GCCX, HSVCULTURE, LABHSV  No results found for: LEGIONELLA, MRSACX, MUMPSCX, MYCOPLASCX  No results found for: NOCARDIACX, STOOLCX  No results found for: THROATCX, UNSTIMCULT, URINECX, CULTURE, VZVCULTUR  No results found for: VIRALCULTU, WOUNDCX  Medication Reviewed and Will Continue Unless Documented in Plan:   atorvastatin, 40 mg, Oral, Nightly  budesonide, 0.5 mg, Nebulization, BID - RT  carvedilol, 6.25 mg, Oral, BID With Meals  clopidogrel, 75 mg, Oral, Daily  escitalopram, 30 mg, Oral, Daily  furosemide, 60 mg, Intravenous, Once  heparin (porcine), 5,000 Units, Subcutaneous, Q8H  Insulin Aspart, 0-12 Units, Subcutaneous, 4x Daily AC & at Bedtime  isosorbide dinitrate, 15 mg, Oral, TID - Nitrates  methylPREDNISolone sodium succinate, 40 mg, Intravenous, Q12H  potassium chloride, 40 mEq, Per PEG Tube, Once  sodium chloride, 3 mL, Intracatheter, Q12H  traZODone, 25 mg, Per PEG Tube, Nightly         •  acetaminophen **OR** acetaminophen **OR** acetaminophen  •  albuterol  •  ALPRAZolam  •  dextrose  •  guaifenesin  •  ipratropium-albuterol  •  ondansetron **OR** ondansetron  •  sodium chloride  •  sodium chloride      Dietary Orders (From admission, onward)     Start     Ordered    01/13/23 1548  Diet, Tube Feeding Diabetisource AC (Glucerna 1.2); Tube Feeding Type: Continuous; Continuous Tube Feeding Start Rate (mL/hr): 30; Then Advance Rate By (mL/hr): 10; Every __ Hours: 8; To Goal Rate of (mL/hr): 60  Diet Effective Now        Question Answer Comment   Tube Feeding Formula: Diabetisource AC (Glucerna 1.2)    Tube Feeding Type Continuous    Continuous Tube Feeding Start Rate (mL/hr) 30    Then Advance Rate By (mL/hr) 10    Every __ Hours 8    To Goal Rate of (mL/hr) 60    Water Flush Amount (mL) 70    Water Flush Frequency Every 1 Hour        01/13/23 1548    01/12/23 1600  NPO Diet NPO Type: Tube Feeding  Diet  Effective Now        Question:  NPO Type  Answer:  Tube Feeding    01/12/23 1600              History, physical exam, assessment and plan may have been partly or fully copied from before, but  changes made to the copied record to reflect care on the date of service. Part of the lab and imaging  reports auto populated and corrected. Some of this note may be an electronic transcription of spoken  language to printed text. This may permit erroneous, or at times, nonsensical words or phrases to be  inadvertently transcribed. Although I have reviewed the note for such errors, some may still exist.      This document has been electronically signed by Josue Howe MD on January 15, 2023 12:58 CST

## 2023-01-16 NOTE — ACP (ADVANCE CARE PLANNING)
"  [facility]  [address] [place] [address]. [address]  T - [phone]  F - [phone]     Progress Note          PATIENT  DEMOGRAPHICS   PATIENT NAME: Jonathan Williamson                      PHYSICIAN: Timothy Turner MD  : 1955  MRN: [id number]   LOS: 0 days    Patient Care Team:  Provider, No Known as PCP - General  Subjective   SUBJECTIVE   Remained on bipap. weak         Objective   OBJECTIVE   Vital Signs  Heart Rate:  [0-70] 0  T 96.9  HR 78  RR 25 /59  Flowsheet Rows    Flowsheet Row First Filed Value   Admission Height 165 cm (64.96\") Documented at 2023   Admission Weight 45.5 kg (100 lb 5 oz) Documented at 2023           No intake/output data recorded.    PHYSICAL EXAM    Physical Exam  Constitutional:       General: He is in acute distress.      Appearance: He is well-developed. He is ill-appearing.   HENT:      Head: Normocephalic.   Eyes:      Pupils: Pupils are equal, round, and reactive to light.   Cardiovascular:      Rate and Rhythm: Normal rate and regular rhythm.      Heart sounds: Normal heart sounds.   Pulmonary:      Effort: Respiratory distress present.      Breath sounds: Normal breath sounds.   Abdominal:      General: Bowel sounds are normal.      Palpations: Abdomen is soft.   Musculoskeletal:         General: No swelling.   Skin:     Coloration: Skin is not jaundiced.   Neurological:      Mental Status: He is alert and oriented to person, place, and time.      Sensory: No sensory deficit.           RESULTS   Results Review:    Results from last 7 days   Lab Units 23  0620 23  0619 23  2330 23  1451 23  0631   SODIUM mmol/L 134* 145 147*   < > 151*   POTASSIUM mmol/L 5.6* 4.4 4.4   < > 4.2   CHLORIDE mmol/L 85* 94* 97*   < > 101   CO2 mmol/L 47.0* >50.0* >50.0*   < > 50.0*   BUN mg/dL 109* 45* 46*   < > 49*   CREATININE mg/dL 1.61* 0.50* 0.47*   < > 0.52*   CALCIUM mg/dL 8.4* 8.3* 8.2*   < > 8.3*   BILIRUBIN " mg/dL 0.2  --   --   --  0.3   ALK PHOS U/L 114  --   --   --  120*   ALT (SGPT) U/L 19  --   --   --  37   AST (SGOT) U/L 20  --   --   --  24   GLUCOSE mg/dL 277* 226* 169*   < > 274*    < > = values in this interval not displayed.       Estimated Creatinine Clearance: 29 mL/min (A) (by C-G formula based on SCr of 1.61 mg/dL (H)).    Results from last 7 days   Lab Units 01/16/23  0620 01/12/23  0631   MAGNESIUM mg/dL 3.1* 2.5*             Results from last 7 days   Lab Units 01/16/23  0620 01/12/23  0631   WBC 10*3/mm3 7.88 11.83*   HEMOGLOBIN g/dL 8.7* 10.4*   PLATELETS 10*3/mm3 124* 302               Imaging Results (Last 24 Hours)     ** No results found for the last 24 hours. **           MEDICATIONS            Assessment & Plan   ASSESSMENT / PLAN      * No active hospital problems. *    1.  Hypernatremia- sodium up to 151, appears dry on exam.  na some better with free water with tube feeds to 75 mils per hour.   BUN is also elevated suggesting prerenal azotemia.     2.  Acute on chronic hypoxic respiratory failure- continue noninvasive ventilatory support, manage per primary team. hypercarbiac respiratory failure with compensatory metabolic alkalosis. Ph is 7.37 last time, poor prognosis overall      3.  COPD exacerbation     4.  S/p cardiac arrest     5.  CAD     6.  Diastolic dysfunction/CHF     7.  Cachexia/malnutrition- continue tube feeds per primary team                This document has been electronically signed by Timothy Turner MD on January 16, 2023 10:40 CST

## 2023-01-17 NOTE — ACP (ADVANCE CARE PLANNING)
[facility] @ [facility]  DISCHARGE SUMMARY     PATIENT NAME: Jonathan Williamson      PHYSICIAN: Josue Howe MD  : 1955        MRN: [id number]  Patient Care Team:  Provider, No Known as PCP - General    Date of Admission: 2023  Date of Death:  2023 at approximately 0921  Primary Care Physician: Provider, No Known    Presenting Problem/History of Present Illness  Chief Complaint: Patient was admitted after arrest and return of spontaneous circulation at [facility].  History of Present Illness: 67-year-old gentleman with history of COPD medic pneumothorax who had been admitted to [facility] after patient was found to have chest pain and had cardiac arrest patient had received cardiopulmonary resuscitation as per ACLS protocol with epinephrine which resulted in return of spontaneous circulation.  Patient was intubated and was on sedation.  Patient's ABG was reported 7.08/109 32.  Patient was also reported to be hypotensive and norepinephrine drip along with vasopressin drip.  Patient was covered with broad-spectrum IV antibiotics.  Patient was admitted to [facility] for further treatment.  Patient's condition gradually improved.  Patient was managed to be extubated however patient remains extremely weak and was not able to tolerate oral diet.  Patient was continued on antibiotics.  Due to patient's extreme critical illness along with weakness and deconditioning with requirement of Dobbhoff feedings patient was recommended admitted to our facility the discharge summary dictated on  had been reviewed as documented above.  At the time of evaluation patient was resting comfortably.  Patient denies any complaints.  No headache or blurry vision reported.  No chest pain palpitation reported.  Patient states he is having some chest tightness however he attributes to the CPR.  Patient also complains of feeling weak.  No  nausea vomiting reported.  Patient's echocardiogram done on December 28 suggestive of diastolic congestive heart failure.  Patient's EKG done on December suggestive of normal sinus rhythm at 78 bpm with no acute ST changes noted.  No chamber enlargement has been identified.  Patient CT head was negative for any acute findings as well.  That was done on December 27, 2022.  Laboratory results prior to coming to her facility where from January 4, 2023 white count 12.9 hemoglobin 11.1 hematocrit 34.3 platelet count of 338.  Sodium 134 potassium 4.9 chloride 94 bicarb 39 BUN of 18 creatinine 0.4 calcium 8.4.  Patient will be admitted to our facility for further therapy especially try to get patient off of oxygen supplementation along with aggressive therapy and try to advance diet as tolerated.        Final Death Diagnoses:  Acute on chronic hypoxic respiratory failure.  S/p cardiac arrest.  Septic shock, resolved.  Critical illness myopathy.  Grade diastolic dysfunction with congestive heart failure.  Hypothermia.  Coronary artery disease.  Chronic obstructive pulmonary disease exacerbation.  Hypertension  Dyslipidemia.  Depression.  DVT and GI prophylaxis.  Cachexia with BMI less than and severe protein calorie malnutrition.       Consults:   Consults     No orders found from 12/6/2022 to 1/5/2023.          Procedures Performed: None                Pertinent Test Results: None    Hospital Course:  The patient is a 67 y.o. male who presented to [facility] after return of spontaneous circulation via EMS.  He was placed on sedation and mechanical ventilation.  Throughout stay at [facility], he clinically improved and was extubated successfully.  He was transferred to our facility for ongoing pulmonary rehabilitation and medical management.  Patient was noted to continue to decline due to his end stage COPD.  He was placed on bi-pap therapy and no improvement was noted.  Despite all clinical efforts, patient continued with  poor prognosis.  After many discussions with patient and family, patient opted for comfort measures and no further curative care.  He was made a DNR/comfort measures only and was noted to  on 2023 at approximately 0921.            Josue Howe MD  23  20:32 CST    Time: 39  History, physical exam, assessment and plan may have been partly or fully copied from before, but  changes made to the copied record to reflect care on the date of service. Part of the lab and imaging  reports auto populated and corrected. Some of this note may be an electronic transcription of spoken  language to printed text. This may permit erroneous, or at times, nonsensical words or phrases to be  inadvertently transcribed. Although I have reviewed the note for such errors, some may still exist.      This document has been electronically signed by Josue Howe MD on 2023 20:32 CST